# Patient Record
Sex: MALE | Race: WHITE
[De-identification: names, ages, dates, MRNs, and addresses within clinical notes are randomized per-mention and may not be internally consistent; named-entity substitution may affect disease eponyms.]

---

## 2017-09-05 ENCOUNTER — HOSPITAL ENCOUNTER (EMERGENCY)
Dept: HOSPITAL 8 - ED | Age: 62
Discharge: HOME | End: 2017-09-05
Payer: COMMERCIAL

## 2017-09-05 VITALS — WEIGHT: 255.74 LBS | HEIGHT: 67 IN | BODY MASS INDEX: 40.14 KG/M2

## 2017-09-05 VITALS — DIASTOLIC BLOOD PRESSURE: 66 MMHG | SYSTOLIC BLOOD PRESSURE: 101 MMHG

## 2017-09-05 DIAGNOSIS — N20.2: ICD-10-CM

## 2017-09-05 DIAGNOSIS — N13.2: Primary | ICD-10-CM

## 2017-09-05 LAB
AST SERPL-CCNC: 17 U/L (ref 15–37)
BUN SERPL-MCNC: 23 MG/DL (ref 7–18)
HCT VFR BLD CALC: 44.4 % (ref 39.2–51.8)
HGB BLD-MCNC: 14.9 G/DL (ref 13.7–18)
WBC # BLD AUTO: 7.9 X10^3/UL (ref 3.4–10)

## 2017-09-05 PROCEDURE — 99285 EMERGENCY DEPT VISIT HI MDM: CPT

## 2017-09-05 PROCEDURE — 74176 CT ABD & PELVIS W/O CONTRAST: CPT

## 2017-09-05 PROCEDURE — 83690 ASSAY OF LIPASE: CPT

## 2017-09-05 PROCEDURE — 85025 COMPLETE CBC W/AUTO DIFF WBC: CPT

## 2017-09-05 PROCEDURE — 81003 URINALYSIS AUTO W/O SCOPE: CPT

## 2017-09-05 PROCEDURE — 36415 COLL VENOUS BLD VENIPUNCTURE: CPT

## 2017-09-05 PROCEDURE — 80053 COMPREHEN METABOLIC PANEL: CPT

## 2017-09-19 ENCOUNTER — HOSPITAL ENCOUNTER (EMERGENCY)
Dept: HOSPITAL 8 - ED | Age: 62
Discharge: HOME | End: 2017-09-19
Payer: COMMERCIAL

## 2017-09-19 VITALS — SYSTOLIC BLOOD PRESSURE: 120 MMHG | DIASTOLIC BLOOD PRESSURE: 74 MMHG

## 2017-09-19 VITALS — BODY MASS INDEX: 41.99 KG/M2 | HEIGHT: 66 IN | WEIGHT: 261.25 LBS

## 2017-09-19 DIAGNOSIS — N20.0: ICD-10-CM

## 2017-09-19 DIAGNOSIS — Z00.00: Primary | ICD-10-CM

## 2017-09-19 PROCEDURE — 99281 EMR DPT VST MAYX REQ PHY/QHP: CPT

## 2019-03-29 ENCOUNTER — HOSPITAL ENCOUNTER (OUTPATIENT)
Dept: HOSPITAL 8 - WOUND | Age: 64
Discharge: HOME | End: 2019-03-29
Attending: FAMILY MEDICINE
Payer: COMMERCIAL
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
## 2019-03-29 DIAGNOSIS — E66.01: ICD-10-CM

## 2019-03-29 DIAGNOSIS — M45.0: ICD-10-CM
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
## 2019-03-29 DIAGNOSIS — S61.021A: Primary | ICD-10-CM

## 2019-03-29 PROCEDURE — 97598 DBRDMT OPN WND ADDL 20CM/<: CPT

## 2019-03-29 PROCEDURE — 97597 DBRDMT OPN WND 1ST 20 CM/<: CPT

## 2019-03-29 PROCEDURE — 99205 OFFICE O/P NEW HI 60 MIN: CPT

## 2019-03-29 PROCEDURE — 11042 DBRDMT SUBQ TIS 1ST 20SQCM/<: CPT

## 2019-03-29 PROCEDURE — 11104 PUNCH BX SKIN SINGLE LESION: CPT

## 2019-03-29 PROCEDURE — 11045 DBRDMT SUBQ TISS EACH ADDL: CPT

## 2019-04-12 ENCOUNTER — HOSPITAL ENCOUNTER (OUTPATIENT)
Dept: HOSPITAL 8 - WOUND | Age: 64
Discharge: HOME | End: 2019-04-12
Attending: FAMILY MEDICINE
Payer: COMMERCIAL

## 2019-04-12 DIAGNOSIS — M45.0: ICD-10-CM
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
## 2019-04-12 DIAGNOSIS — S61.421D: Primary | ICD-10-CM

## 2019-04-12 DIAGNOSIS — E66.01: ICD-10-CM

## 2019-04-12 PROCEDURE — 99212 OFFICE O/P EST SF 10 MIN: CPT

## 2020-04-09 ENCOUNTER — HOSPITAL ENCOUNTER (OUTPATIENT)
Dept: RADIOLOGY | Facility: MEDICAL CENTER | Age: 65
End: 2020-04-09
Payer: COMMERCIAL

## 2020-04-09 ENCOUNTER — HOSPITAL ENCOUNTER (EMERGENCY)
Dept: HOSPITAL 8 - ED | Age: 65
Discharge: ANOTHER HOSPITAL | End: 2020-04-09
Payer: COMMERCIAL

## 2020-04-09 ENCOUNTER — APPOINTMENT (OUTPATIENT)
Dept: RADIOLOGY | Facility: MEDICAL CENTER | Age: 65
DRG: 038 | End: 2020-04-09
Attending: EMERGENCY MEDICINE
Payer: COMMERCIAL

## 2020-04-09 ENCOUNTER — HOSPITAL ENCOUNTER (INPATIENT)
Facility: MEDICAL CENTER | Age: 65
LOS: 5 days | DRG: 038 | End: 2020-04-14
Attending: EMERGENCY MEDICINE | Admitting: HOSPITALIST
Payer: COMMERCIAL

## 2020-04-09 VITALS — BODY MASS INDEX: 40.03 KG/M2 | WEIGHT: 285.94 LBS | HEIGHT: 71 IN

## 2020-04-09 VITALS — DIASTOLIC BLOOD PRESSURE: 68 MMHG | SYSTOLIC BLOOD PRESSURE: 144 MMHG

## 2020-04-09 DIAGNOSIS — R29.708: ICD-10-CM

## 2020-04-09 DIAGNOSIS — I63.50 CEREBROVASCULAR ACCIDENT (CVA) DUE TO OCCLUSION OF CEREBRAL ARTERY (HCC): ICD-10-CM

## 2020-04-09 DIAGNOSIS — I65.21: ICD-10-CM

## 2020-04-09 DIAGNOSIS — I66.01: ICD-10-CM

## 2020-04-09 DIAGNOSIS — I63.9: Primary | ICD-10-CM

## 2020-04-09 DIAGNOSIS — R51.9 NONINTRACTABLE EPISODIC HEADACHE, UNSPECIFIED HEADACHE TYPE: ICD-10-CM

## 2020-04-09 LAB
APTT PPP: 31.1 SEC (ref 24.7–36)
BASOPHILS # BLD AUTO: 0.02 X10^3/UL (ref 0–0.1)
BASOPHILS NFR BLD AUTO: 0 % (ref 0–1)
EOSINOPHIL # BLD AUTO: 0.14 X10^3/UL (ref 0–0.4)
EOSINOPHIL NFR BLD AUTO: 2 % (ref 1–7)
ERYTHROCYTE [DISTWIDTH] IN BLOOD BY AUTOMATED COUNT: 13.9 % (ref 9.4–14.8)
LYMPHOCYTES # BLD AUTO: 1.24 X10^3/UL (ref 1–3.4)
LYMPHOCYTES NFR BLD AUTO: 19 % (ref 22–44)
MCH RBC QN AUTO: 28.1 PG (ref 27.5–34.5)
MCHC RBC AUTO-ENTMCNC: 32.7 G/DL (ref 33.2–36.2)
MCV RBC AUTO: 86 FL (ref 81–97)
MD: NO
MONOCYTES # BLD AUTO: 0.49 X10^3/UL (ref 0.2–0.8)
MONOCYTES NFR BLD AUTO: 8 % (ref 2–9)
NEUTROPHILS # BLD AUTO: 4.69 X10^3/UL (ref 1.8–6.8)
NEUTROPHILS NFR BLD AUTO: 71 % (ref 42–75)
PLATELET # BLD AUTO: 209 X10^3/UL (ref 130–400)
PMV BLD AUTO: 8.3 FL (ref 7.4–10.4)
RBC # BLD AUTO: 5.26 X10^6/UL (ref 4.38–5.82)

## 2020-04-09 PROCEDURE — 99291 CRITICAL CARE FIRST HOUR: CPT | Performed by: INTERNAL MEDICINE

## 2020-04-09 PROCEDURE — 80047 BASIC METABLC PNL IONIZED CA: CPT

## 2020-04-09 PROCEDURE — G0425 INPT/ED TELECONSULT30: HCPCS | Mod: G0 | Performed by: PSYCHIATRY & NEUROLOGY

## 2020-04-09 PROCEDURE — 99291 CRITICAL CARE FIRST HOUR: CPT

## 2020-04-09 PROCEDURE — 93005 ELECTROCARDIOGRAM TRACING: CPT

## 2020-04-09 PROCEDURE — 70496 CT ANGIOGRAPHY HEAD: CPT

## 2020-04-09 PROCEDURE — 37195 THROMBOLYTIC THERAPY STROKE: CPT

## 2020-04-09 PROCEDURE — 96365 THER/PROPH/DIAG IV INF INIT: CPT

## 2020-04-09 PROCEDURE — 70450 CT HEAD/BRAIN W/O DYE: CPT

## 2020-04-09 PROCEDURE — 70498 CT ANGIOGRAPHY NECK: CPT

## 2020-04-09 PROCEDURE — 700117 HCHG RX CONTRAST REV CODE 255: Performed by: EMERGENCY MEDICINE

## 2020-04-09 PROCEDURE — 36415 COLL VENOUS BLD VENIPUNCTURE: CPT

## 2020-04-09 PROCEDURE — 770022 HCHG ROOM/CARE - ICU (200)

## 2020-04-09 PROCEDURE — 99221 1ST HOSP IP/OBS SF/LOW 40: CPT | Mod: AI | Performed by: HOSPITALIST

## 2020-04-09 PROCEDURE — 83036 HEMOGLOBIN GLYCOSYLATED A1C: CPT

## 2020-04-09 PROCEDURE — 85730 THROMBOPLASTIN TIME PARTIAL: CPT

## 2020-04-09 PROCEDURE — 85025 COMPLETE CBC W/AUTO DIFF WBC: CPT

## 2020-04-09 PROCEDURE — 700111 HCHG RX REV CODE 636 W/ 250 OVERRIDE (IP): Performed by: PSYCHIATRY & NEUROLOGY

## 2020-04-09 RX ORDER — PROCHLORPERAZINE EDISYLATE 5 MG/ML
5-10 INJECTION INTRAMUSCULAR; INTRAVENOUS EVERY 4 HOURS PRN
Status: DISCONTINUED | OUTPATIENT
Start: 2020-04-09 | End: 2020-04-12

## 2020-04-09 RX ORDER — ONDANSETRON 2 MG/ML
4 INJECTION INTRAMUSCULAR; INTRAVENOUS EVERY 4 HOURS PRN
Status: DISCONTINUED | OUTPATIENT
Start: 2020-04-09 | End: 2020-04-12

## 2020-04-09 RX ORDER — PROMETHAZINE HYDROCHLORIDE 25 MG/1
12.5-25 TABLET ORAL EVERY 4 HOURS PRN
Status: DISCONTINUED | OUTPATIENT
Start: 2020-04-09 | End: 2020-04-12

## 2020-04-09 RX ORDER — ASPIRIN 300 MG/1
300 SUPPOSITORY RECTAL DAILY
Status: DISCONTINUED | OUTPATIENT
Start: 2020-04-10 | End: 2020-04-10

## 2020-04-09 RX ORDER — PROMETHAZINE HYDROCHLORIDE 25 MG/1
12.5-25 SUPPOSITORY RECTAL EVERY 4 HOURS PRN
Status: DISCONTINUED | OUTPATIENT
Start: 2020-04-09 | End: 2020-04-12

## 2020-04-09 RX ORDER — ONDANSETRON 4 MG/1
4 TABLET, ORALLY DISINTEGRATING ORAL EVERY 4 HOURS PRN
Status: DISCONTINUED | OUTPATIENT
Start: 2020-04-09 | End: 2020-04-14 | Stop reason: HOSPADM

## 2020-04-09 RX ORDER — LABETALOL HYDROCHLORIDE 5 MG/ML
10-20 INJECTION, SOLUTION INTRAVENOUS EVERY 4 HOURS PRN
Status: DISCONTINUED | OUTPATIENT
Start: 2020-04-09 | End: 2020-04-10

## 2020-04-09 RX ORDER — ASPIRIN 81 MG/1
324 TABLET, CHEWABLE ORAL DAILY
Status: DISCONTINUED | OUTPATIENT
Start: 2020-04-10 | End: 2020-04-10

## 2020-04-09 RX ORDER — POLYETHYLENE GLYCOL 3350 17 G/17G
1 POWDER, FOR SOLUTION ORAL
Status: DISCONTINUED | OUTPATIENT
Start: 2020-04-09 | End: 2020-04-14 | Stop reason: HOSPADM

## 2020-04-09 RX ORDER — AMOXICILLIN 250 MG
2 CAPSULE ORAL 2 TIMES DAILY
Status: DISCONTINUED | OUTPATIENT
Start: 2020-04-09 | End: 2020-04-14 | Stop reason: HOSPADM

## 2020-04-09 RX ORDER — ASPIRIN 325 MG
325 TABLET ORAL DAILY
Status: DISCONTINUED | OUTPATIENT
Start: 2020-04-10 | End: 2020-04-10

## 2020-04-09 RX ORDER — HYDRALAZINE HYDROCHLORIDE 20 MG/ML
10-20 INJECTION INTRAMUSCULAR; INTRAVENOUS EVERY 4 HOURS PRN
Status: DISCONTINUED | OUTPATIENT
Start: 2020-04-09 | End: 2020-04-10

## 2020-04-09 RX ORDER — BISACODYL 10 MG
10 SUPPOSITORY, RECTAL RECTAL
Status: DISCONTINUED | OUTPATIENT
Start: 2020-04-09 | End: 2020-04-14 | Stop reason: HOSPADM

## 2020-04-09 RX ORDER — SECUKINUMAB 150 MG/ML
150 INJECTION SUBCUTANEOUS
COMMUNITY
Start: 2020-03-17

## 2020-04-09 RX ORDER — HYDRALAZINE HYDROCHLORIDE 20 MG/ML
10 INJECTION INTRAMUSCULAR; INTRAVENOUS EVERY 6 HOURS PRN
Status: DISCONTINUED | OUTPATIENT
Start: 2020-04-09 | End: 2020-04-09

## 2020-04-09 RX ORDER — HEPARIN SODIUM 5000 [USP'U]/100ML
INJECTION, SOLUTION INTRAVENOUS CONTINUOUS
Status: DISCONTINUED | OUTPATIENT
Start: 2020-04-09 | End: 2020-04-12

## 2020-04-09 RX ADMIN — HEPARIN SODIUM 1000 UNITS/HR: 5000 INJECTION, SOLUTION INTRAVENOUS at 22:26

## 2020-04-09 RX ADMIN — IOHEXOL 60 ML: 350 INJECTION, SOLUTION INTRAVENOUS at 21:28

## 2020-04-09 ASSESSMENT — LIFESTYLE VARIABLES
EVER FELT BAD OR GUILTY ABOUT YOUR DRINKING: NO
DOES PATIENT WANT TO STOP DRINKING: NO
TOTAL SCORE: 0
EVER HAD A DRINK FIRST THING IN THE MORNING TO STEADY YOUR NERVES TO GET RID OF A HANGOVER: NO
TOTAL SCORE: 0
HAVE YOU EVER FELT YOU SHOULD CUT DOWN ON YOUR DRINKING: NO
CONSUMPTION TOTAL: INCOMPLETE
DO YOU DRINK ALCOHOL: NO
HAVE PEOPLE ANNOYED YOU BY CRITICIZING YOUR DRINKING: NO
TOTAL SCORE: 0

## 2020-04-10 ENCOUNTER — APPOINTMENT (OUTPATIENT)
Dept: RADIOLOGY | Facility: MEDICAL CENTER | Age: 65
DRG: 038 | End: 2020-04-10
Attending: INTERNAL MEDICINE
Payer: COMMERCIAL

## 2020-04-10 ENCOUNTER — APPOINTMENT (OUTPATIENT)
Dept: RADIOLOGY | Facility: MEDICAL CENTER | Age: 65
DRG: 038 | End: 2020-04-10
Attending: HOSPITALIST
Payer: COMMERCIAL

## 2020-04-10 ENCOUNTER — APPOINTMENT (OUTPATIENT)
Dept: CARDIOLOGY | Facility: MEDICAL CENTER | Age: 65
DRG: 038 | End: 2020-04-10
Attending: INTERNAL MEDICINE
Payer: COMMERCIAL

## 2020-04-10 DIAGNOSIS — I63.511 CEREBROVASCULAR ACCIDENT (CVA) DUE TO OCCLUSION OF RIGHT MIDDLE CEREBRAL ARTERY (HCC): ICD-10-CM

## 2020-04-10 PROBLEM — I65.21 OCCLUSION OF RIGHT CAROTID ARTERY: Status: ACTIVE | Noted: 2020-04-10

## 2020-04-10 PROBLEM — E66.01 CLASS 3 SEVERE OBESITY DUE TO EXCESS CALORIES WITH SERIOUS COMORBIDITY IN ADULT (HCC): Status: ACTIVE | Noted: 2020-04-10

## 2020-04-10 PROBLEM — E66.813 CLASS 3 SEVERE OBESITY DUE TO EXCESS CALORIES WITH SERIOUS COMORBIDITY IN ADULT (HCC): Status: ACTIVE | Noted: 2020-04-10

## 2020-04-10 PROBLEM — M45.9 ANKYLOSING SPONDYLITIS (HCC): Status: ACTIVE | Noted: 2020-04-10

## 2020-04-10 LAB
ANION GAP SERPL CALC-SCNC: 10 MMOL/L (ref 7–16)
APTT PPP: 39.8 SEC (ref 24.7–36)
APTT PPP: 51.3 SEC (ref 24.7–36)
APTT PPP: 56.9 SEC (ref 24.7–36)
BASOPHILS # BLD AUTO: 0.5 % (ref 0–1.8)
BASOPHILS # BLD: 0.04 K/UL (ref 0–0.12)
BUN SERPL-MCNC: 14 MG/DL (ref 8–22)
CALCIUM SERPL-MCNC: 9 MG/DL (ref 8.5–10.5)
CHLORIDE SERPL-SCNC: 103 MMOL/L (ref 96–112)
CHOLEST SERPL-MCNC: 123 MG/DL (ref 100–199)
CO2 SERPL-SCNC: 25 MMOL/L (ref 20–33)
CREAT SERPL-MCNC: 0.68 MG/DL (ref 0.5–1.4)
EOSINOPHIL # BLD AUTO: 0 K/UL (ref 0–0.51)
EOSINOPHIL NFR BLD: 0 % (ref 0–6.9)
ERYTHROCYTE [DISTWIDTH] IN BLOOD BY AUTOMATED COUNT: 42 FL (ref 35.9–50)
EST. AVERAGE GLUCOSE BLD GHB EST-MCNC: 117 MG/DL
GLUCOSE SERPL-MCNC: 94 MG/DL (ref 65–99)
HBA1C MFR BLD: 5.7 % (ref 0–5.6)
HCT VFR BLD AUTO: 42.9 % (ref 42–52)
HDLC SERPL-MCNC: 41 MG/DL
HGB BLD-MCNC: 14.1 G/DL (ref 14–18)
IMM GRANULOCYTES # BLD AUTO: 0.03 K/UL (ref 0–0.11)
IMM GRANULOCYTES NFR BLD AUTO: 0.4 % (ref 0–0.9)
LDLC SERPL CALC-MCNC: 71 MG/DL
LV EJECT FRACT  99904: 65
LV EJECT FRACT MOD 2C 99903: 58.7
LV EJECT FRACT MOD 4C 99902: 66.38
LV EJECT FRACT MOD BP 99901: 63.12
LYMPHOCYTES # BLD AUTO: 1.35 K/UL (ref 1–4.8)
LYMPHOCYTES NFR BLD: 18.1 % (ref 22–41)
MCH RBC QN AUTO: 27.9 PG (ref 27–33)
MCHC RBC AUTO-ENTMCNC: 32.9 G/DL (ref 33.7–35.3)
MCV RBC AUTO: 85 FL (ref 81.4–97.8)
MONOCYTES # BLD AUTO: 0.59 K/UL (ref 0–0.85)
MONOCYTES NFR BLD AUTO: 7.9 % (ref 0–13.4)
NEUTROPHILS # BLD AUTO: 5.43 K/UL (ref 1.82–7.42)
NEUTROPHILS NFR BLD: 73.1 % (ref 44–72)
NRBC # BLD AUTO: 0 K/UL
NRBC BLD-RTO: 0 /100 WBC
PLATELET # BLD AUTO: 208 K/UL (ref 164–446)
PMV BLD AUTO: 10.3 FL (ref 9–12.9)
POTASSIUM SERPL-SCNC: 3.7 MMOL/L (ref 3.6–5.5)
RBC # BLD AUTO: 5.05 M/UL (ref 4.7–6.1)
SODIUM SERPL-SCNC: 138 MMOL/L (ref 135–145)
TRIGL SERPL-MCNC: 55 MG/DL (ref 0–149)
WBC # BLD AUTO: 7.4 K/UL (ref 4.8–10.8)

## 2020-04-10 PROCEDURE — A9270 NON-COVERED ITEM OR SERVICE: HCPCS | Performed by: SURGERY

## 2020-04-10 PROCEDURE — 700102 HCHG RX REV CODE 250 W/ 637 OVERRIDE(OP): Performed by: SURGERY

## 2020-04-10 PROCEDURE — 770022 HCHG ROOM/CARE - ICU (200)

## 2020-04-10 PROCEDURE — 85730 THROMBOPLASTIN TIME PARTIAL: CPT | Mod: 91

## 2020-04-10 PROCEDURE — 70551 MRI BRAIN STEM W/O DYE: CPT

## 2020-04-10 PROCEDURE — 92610 EVALUATE SWALLOWING FUNCTION: CPT

## 2020-04-10 PROCEDURE — 93306 TTE W/DOPPLER COMPLETE: CPT | Mod: 26 | Performed by: INTERNAL MEDICINE

## 2020-04-10 PROCEDURE — 85025 COMPLETE CBC W/AUTO DIFF WBC: CPT

## 2020-04-10 PROCEDURE — 93306 TTE W/DOPPLER COMPLETE: CPT

## 2020-04-10 PROCEDURE — 700102 HCHG RX REV CODE 250 W/ 637 OVERRIDE(OP): Performed by: PSYCHIATRY & NEUROLOGY

## 2020-04-10 PROCEDURE — A9270 NON-COVERED ITEM OR SERVICE: HCPCS | Performed by: HOSPITALIST

## 2020-04-10 PROCEDURE — 700105 HCHG RX REV CODE 258: Performed by: INTERNAL MEDICINE

## 2020-04-10 PROCEDURE — 80061 LIPID PANEL: CPT

## 2020-04-10 PROCEDURE — A9270 NON-COVERED ITEM OR SERVICE: HCPCS | Performed by: PSYCHIATRY & NEUROLOGY

## 2020-04-10 PROCEDURE — 700102 HCHG RX REV CODE 250 W/ 637 OVERRIDE(OP): Performed by: HOSPITALIST

## 2020-04-10 PROCEDURE — 80048 BASIC METABOLIC PNL TOTAL CA: CPT

## 2020-04-10 PROCEDURE — 99291 CRITICAL CARE FIRST HOUR: CPT | Performed by: PSYCHIATRY & NEUROLOGY

## 2020-04-10 RX ORDER — OMEPRAZOLE 20 MG/1
20 CAPSULE, DELAYED RELEASE ORAL ONCE
Status: COMPLETED | OUTPATIENT
Start: 2020-04-10 | End: 2020-04-10

## 2020-04-10 RX ORDER — HYDRALAZINE HYDROCHLORIDE 20 MG/ML
10-20 INJECTION INTRAMUSCULAR; INTRAVENOUS EVERY 4 HOURS PRN
Status: DISCONTINUED | OUTPATIENT
Start: 2020-04-10 | End: 2020-04-12

## 2020-04-10 RX ORDER — SODIUM CHLORIDE 9 MG/ML
INJECTION, SOLUTION INTRAVENOUS CONTINUOUS
Status: DISCONTINUED | OUTPATIENT
Start: 2020-04-10 | End: 2020-04-13

## 2020-04-10 RX ORDER — CLOPIDOGREL BISULFATE 75 MG/1
75 TABLET ORAL DAILY
Status: DISCONTINUED | OUTPATIENT
Start: 2020-04-10 | End: 2020-04-14 | Stop reason: HOSPADM

## 2020-04-10 RX ORDER — LABETALOL HYDROCHLORIDE 5 MG/ML
10-20 INJECTION, SOLUTION INTRAVENOUS EVERY 4 HOURS PRN
Status: DISCONTINUED | OUTPATIENT
Start: 2020-04-10 | End: 2020-04-12

## 2020-04-10 RX ORDER — HYDRALAZINE HYDROCHLORIDE 20 MG/ML
20 INJECTION INTRAMUSCULAR; INTRAVENOUS EVERY 4 HOURS PRN
Status: DISCONTINUED | OUTPATIENT
Start: 2020-04-10 | End: 2020-04-10

## 2020-04-10 RX ADMIN — OMEPRAZOLE 20 MG: 20 CAPSULE, DELAYED RELEASE ORAL at 13:09

## 2020-04-10 RX ADMIN — SODIUM CHLORIDE: 9 INJECTION, SOLUTION INTRAVENOUS at 00:30

## 2020-04-10 RX ADMIN — SENNOSIDES AND DOCUSATE SODIUM 2 TABLET: 8.6; 5 TABLET ORAL at 17:48

## 2020-04-10 RX ADMIN — CLOPIDOGREL BISULFATE 75 MG: 75 TABLET ORAL at 22:33

## 2020-04-10 SDOH — HEALTH STABILITY: MENTAL HEALTH: HOW OFTEN DO YOU HAVE A DRINK CONTAINING ALCOHOL?: NEVER

## 2020-04-10 ASSESSMENT — ENCOUNTER SYMPTOMS
FLANK PAIN: 0
MYALGIAS: 0
SENSORY CHANGE: 1
HEARTBURN: 1
MUSCULOSKELETAL NEGATIVE: 1
SPEECH CHANGE: 0
GASTROINTESTINAL NEGATIVE: 1
WEAKNESS: 0
CONSTITUTIONAL NEGATIVE: 1
PSYCHIATRIC NEGATIVE: 1
HALLUCINATIONS: 0
NERVOUS/ANXIOUS: 0
COUGH: 0
RESPIRATORY NEGATIVE: 1
NAUSEA: 0
DEPRESSION: 0
CARDIOVASCULAR NEGATIVE: 1
HEADACHES: 0
EYES NEGATIVE: 1
BLURRED VISION: 0
NECK PAIN: 0
LOSS OF CONSCIOUSNESS: 0
SORE THROAT: 0
FEVER: 0
SPEECH CHANGE: 1
SEIZURES: 0
VOMITING: 0
DIZZINESS: 0
BACK PAIN: 0
CHILLS: 0
ABDOMINAL PAIN: 0
BRUISES/BLEEDS EASILY: 0
WEIGHT LOSS: 0
FOCAL WEAKNESS: 1
SHORTNESS OF BREATH: 0

## 2020-04-10 ASSESSMENT — LIFESTYLE VARIABLES
EVER FELT BAD OR GUILTY ABOUT YOUR DRINKING: NO
HAVE YOU EVER FELT YOU SHOULD CUT DOWN ON YOUR DRINKING: NO
TOTAL SCORE: 0
EVER_SMOKED: NEVER
HAVE PEOPLE ANNOYED YOU BY CRITICIZING YOUR DRINKING: NO
EVER HAD A DRINK FIRST THING IN THE MORNING TO STEADY YOUR NERVES TO GET RID OF A HANGOVER: NO
HOW MANY TIMES IN THE PAST YEAR HAVE YOU HAD 5 OR MORE DRINKS IN A DAY: 0
CONSUMPTION TOTAL: NEGATIVE
TOTAL SCORE: 0
ON A TYPICAL DAY WHEN YOU DRINK ALCOHOL HOW MANY DRINKS DO YOU HAVE: 0
TOTAL SCORE: 0
DOES PATIENT WANT TO STOP DRINKING: NO
AVERAGE NUMBER OF DAYS PER WEEK YOU HAVE A DRINK CONTAINING ALCOHOL: 0
ALCOHOL_USE: NO

## 2020-04-10 ASSESSMENT — COGNITIVE AND FUNCTIONAL STATUS - GENERAL
TOILETING: A LITTLE
MOBILITY SCORE: 18
DAILY ACTIVITIY SCORE: 19
SUGGESTED CMS G CODE MODIFIER MOBILITY: CK
HELP NEEDED FOR BATHING: A LITTLE
TURNING FROM BACK TO SIDE WHILE IN FLAT BAD: A LITTLE
MOVING FROM LYING ON BACK TO SITTING ON SIDE OF FLAT BED: A LITTLE
SUGGESTED CMS G CODE MODIFIER DAILY ACTIVITY: CK
DRESSING REGULAR UPPER BODY CLOTHING: A LITTLE
WALKING IN HOSPITAL ROOM: A LITTLE
PERSONAL GROOMING: A LITTLE
DRESSING REGULAR LOWER BODY CLOTHING: A LITTLE
CLIMB 3 TO 5 STEPS WITH RAILING: A LITTLE
STANDING UP FROM CHAIR USING ARMS: A LITTLE
MOVING TO AND FROM BED TO CHAIR: A LITTLE

## 2020-04-10 ASSESSMENT — PATIENT HEALTH QUESTIONNAIRE - PHQ9
7. TROUBLE CONCENTRATING ON THINGS, SUCH AS READING THE NEWSPAPER OR WATCHING TELEVISION: NOT AT ALL
4. FEELING TIRED OR HAVING LITTLE ENERGY: NOT AT ALL
3. TROUBLE FALLING OR STAYING ASLEEP OR SLEEPING TOO MUCH: NOT AT ALL
1. LITTLE INTEREST OR PLEASURE IN DOING THINGS: NOT AT ALL
8. MOVING OR SPEAKING SO SLOWLY THAT OTHER PEOPLE COULD HAVE NOTICED. OR THE OPPOSITE, BEING SO FIGETY OR RESTLESS THAT YOU HAVE BEEN MOVING AROUND A LOT MORE THAN USUAL: NOT AT ALL
2. FEELING DOWN, DEPRESSED, IRRITABLE, OR HOPELESS: SEVERAL DAYS
6. FEELING BAD ABOUT YOURSELF - OR THAT YOU ARE A FAILURE OR HAVE LET YOURSELF OR YOUR FAMILY DOWN: NOT AL ALL
SUM OF ALL RESPONSES TO PHQ9 QUESTIONS 1 AND 2: 1
SUM OF ALL RESPONSES TO PHQ QUESTIONS 1-9: 1
9. THOUGHTS THAT YOU WOULD BE BETTER OFF DEAD, OR OF HURTING YOURSELF: NOT AT ALL
5. POOR APPETITE OR OVEREATING: NOT AT ALL

## 2020-04-10 ASSESSMENT — COPD QUESTIONNAIRES
COPD SCREENING SCORE: 2
IN THE PAST 12 MONTHS DO YOU DO LESS THAN YOU USED TO BECAUSE OF YOUR BREATHING PROBLEMS: DISAGREE/UNSURE
DO YOU EVER COUGH UP ANY MUCUS OR PHLEGM?: NO/ONLY WITH OCCASIONAL COLDS OR INFECTIONS
HAVE YOU SMOKED AT LEAST 100 CIGARETTES IN YOUR ENTIRE LIFE: NO/DON'T KNOW
DURING THE PAST 4 WEEKS HOW MUCH DID YOU FEEL SHORT OF BREATH: NONE/LITTLE OF THE TIME

## 2020-04-10 NOTE — NUR
TASK RN: LATE ENTRY 4/9/2020 Carina ACEVES FROM LAB CALLED AND REPORTED THAT [facility] 
[facility] LAB VIAL FOR COAGULATION (PT,INR,APTT) UNABLE TO BE COMPLETED DUE TO 
INSUFFICENT QUANTITY AND WOULD NEED TO BE RE-DRAWN. DISCUSSED WITH DR. SCHMIDT, PRIMARY RN IVAN, ALL AWARE, PER DR. SCHMIDT "NO RE-DRAWN 
NEEDED, PT BEING TRANSFERRED TO [facility] AND TPA ALREADY INFUSING"

## 2020-04-10 NOTE — H&P
Hospital Medicine History & Physical Note    Date of Service  4/9/2020    Primary Care Physician  No primary care provider on file.    Consultants  Neurology  Interventional Radiology  Vascular surgery      Code Status  Full code    Chief Complaint  Left facial droop    History of Presenting Illness  64 y.o. male who presented 4/9/2020 with left sided weakness and facial droop. He presented to [facility] and was found to have a right carotid clot and M3 clot. They infused alteplase and transferred him to [facility] for IR consult, neurology and vascular consult. He started to improve en route.    I discussed the presenting symptoms, physical examination, lab and radiological study results with the emergency department physician.      Review of Systems  Review of Systems   Constitutional: Negative.  Negative for chills, fever, malaise/fatigue and weight loss.   HENT: Negative.    Respiratory: Negative.  Negative for cough and shortness of breath.    Cardiovascular: Negative.  Negative for chest pain and leg swelling.   Gastrointestinal: Negative.  Negative for abdominal pain, nausea and vomiting.   Genitourinary: Negative.  Negative for dysuria and flank pain.   Musculoskeletal: Negative.  Negative for back pain and myalgias.   Neurological: Positive for speech change and focal weakness. Negative for dizziness, loss of consciousness and weakness.   Endo/Heme/Allergies: Negative.  Does not bruise/bleed easily.   Psychiatric/Behavioral: Negative.  Negative for depression. The patient is not nervous/anxious.    All other systems reviewed and are negative.      Past Medical History   has no past medical history on file.    Surgical History   has no past surgical history on file.     Family History  family history is not on file.     Social History       Allergies  No Known Allergies    Medications  Prior to Admission Medications   Prescriptions Last Dose Informant Patient Reported? Taking?   COSENTYX SENSOREADY  MG/ML  Solution Auto-injector UNK Patient's Home Pharmacy Yes No   Sig: Inject 150 mg as instructed every 28 days.   Diclofenac Sodium 1 % Gel UNK Patient's Home Pharmacy Yes No   Sig: Apply 2-4 g to affected area(s) 4 times a day as needed for Moderate Pain.      Facility-Administered Medications: None       Physical Exam  Temp:  [36.8 °C (98.2 °F)] 36.8 °C (98.2 °F)  Pulse:  [87-95] 95  Resp:  [16-18] 18  BP: (131-143)/(72-81) 131/81  SpO2:  [95 %-97 %] 95 %    Physical Exam  Vitals signs and nursing note reviewed.   Constitutional:       General: He is not in acute distress.     Appearance: He is well-developed. He is not diaphoretic.   HENT:      Right Ear: External ear normal.      Left Ear: External ear normal.      Nose: Nose normal.      Mouth/Throat:      Pharynx: No oropharyngeal exudate.   Eyes:      General: No scleral icterus.        Right eye: No discharge.         Left eye: No discharge.      Conjunctiva/sclera: Conjunctivae normal.   Neck:      Vascular: No JVD.      Trachea: No tracheal deviation.   Cardiovascular:      Rate and Rhythm: Normal rate and regular rhythm.      Heart sounds: Normal heart sounds.   Pulmonary:      Effort: Pulmonary effort is normal. No respiratory distress.      Breath sounds: Normal breath sounds. No stridor. No wheezing or rales.   Chest:      Chest wall: No tenderness.   Abdominal:      General: Bowel sounds are normal. There is no distension.      Palpations: Abdomen is soft.      Tenderness: There is no abdominal tenderness.   Musculoskeletal:         General: No tenderness.   Skin:     General: Skin is warm and dry.      Coloration: Skin is not pale.   Neurological:      Mental Status: He is alert.      Cranial Nerves: No cranial nerve deficit.      Motor: No abnormal muscle tone.   Psychiatric:         Mood and Affect: Mood normal.         Behavior: Behavior normal.         Thought Content: Thought content normal.         Judgment: Judgment normal.         Laboratory:           No results for input(s): ALTSGPT, ASTSGOT, ALKPHOSPHAT, TBILIRUBIN, DBILIRUBIN, GAMMAGT, AMYLASE, LIPASE, ALB, PREALBUMIN, GLUCOSE in the last 72 hours.      No results for input(s): NTPROBNP in the last 72 hours.      No results for input(s): TROPONINT in the last 72 hours.    Urinalysis:    No results found     Imaging:  CT-CTA HEAD WITH & W/O-POST PROCESS   Final Result      1.  RIGHT M2 occlusion   2.  No intracranial hemorrhage.      Discussed with Dr. Sullivan at 2148      CT-CTA NECK WITH & W/O-POST PROCESSING   Final Result      1.  Mixed density, irregular plaque in the proximal RIGHT internal carotid artery causing less than 50% stenosis   2.  Findings suggestive of ankylosing spondylitis      Discussed with Dr. Sullivan at 2148      CT-HEAD W/O    (Results Pending)   EC-ECHOCARDIOGRAM COMPLETE W/O CONT    (Results Pending)   MR-BRAIN-W/O    (Results Pending)         Assessment/Plan:  I anticipate this patient will require at least two midnights for appropriate medical management, necessitating inpatient admission.    * Cerebrovascular accident (CVA) due to occlusion of right middle cerebral artery (HCC)- (present on admission)  Assessment & Plan  S/p alteplase  Admit to ICU, intensivist will take over care  Dr. Burrell will consult for neurology  IR has evaluated, no clot retrieval indicated with clinical improvement on alteplase  Vascular surgery consult for ulcerated plaque in carotid artery  Post-alteplase orders set completed      VTE prophylaxis: scd

## 2020-04-10 NOTE — ED PROVIDER NOTES
ED Provider Note    Scribed for Dr. Buster Virk M.D. by Danitza Rodarte. 4/9/2020  8:08 PM    Primary care provider: None Noted  Means of arrival: EMS  History obtained from: Patient  History limited by: None    CHIEF COMPLAINT  Chief Complaint   Patient presents with   • Possible Stroke     Transfer from [facility], TPA infusion completed at time of arrival, transfer for Interventional Radiology       HPI  Jose Simpson is a 64 y.o. male who presents to the Emergency Department as a transfer from [place] for complaints of sudden left sided weakness and facial droop onset 6 PM. Clot in right carotid was found at [place] and patient was given TPA infusion treatment. He was sent to this facility for interventional radiology. Currently, patient states that symptoms have somewhat improved. He states that onset was sudden and involved symptoms of difficulty speaking, left sided facial droop, and left sided weakness. He describes not being able to move left arm. He is now able to speak and move extremities. Negative for fever, cough, or shortness of breath.      REVIEW OF SYSTEMS  Pertinent positives include left sided facial droop, left sided weakness, and difficulty speaking. Pertinent negatives include no fever, cough, or shortness of breath. As above, all other systems reviewed and are negative.   See HPI for further details.     PAST MEDICAL HISTORY   Clot in right carotid.     SURGICAL HISTORY  patient denies any surgical history    SOCIAL HISTORY  Social History     Tobacco Use   • Smoking status: Not on file   Substance Use Topics   • Alcohol use: Not on file   • Drug use: Not on file      Social History     Substance and Sexual Activity   Drug Use Not on file       FAMILY HISTORY  No family history on file.    CURRENT MEDICATIONS  Home Medications     Reviewed by Calderon Cuellar R.N. (Registered Nurse) on 04/09/20 at 2012  Med List Status: Partial   Medication Last Dose Status        Patient  "Bobby Taking any Medications                       ALLERGIES  No Known Allergies    PHYSICAL EXAM  VITAL SIGNS: /72   Pulse 87   Temp 36.8 °C (98.2 °F) (Temporal)   Resp 16   Ht 1.702 m (5' 7\")   Wt 123 kg (271 lb 2.7 oz)   SpO2 97%   BMI 42.47 kg/m²     Constitutional: Well developed, Well nourished, No acute distress, Non-toxic appearance.   HENT: Normocephalic, Atraumatic, Bilateral external ears normal, Oropharynx moist, No oral exudates.   Eyes: PERRLA, EOMI, Conjunctiva normal, No discharge.   Neck: No tenderness, Supple, No stridor.   Lymphatic: No lymphadenopathy noted.   Cardiovascular: Normal heart rate, Normal rhythm.   Thorax & Lungs: Clear to auscultation bilaterally, No respiratory distress, No wheezing, No crackles.   Abdomen: Soft, No tenderness, No masses, No pulsatile masses.   Skin: Warm, Dry, No erythema, No rash.   Extremities:, No edema No cyanosis.   Musculoskeletal: No tenderness to palpation or major deformities noted.  Intact distal pulses  Neurologic: Awake, alert. Moves all extremities spontaneously. Minimal left sided droop seems now resolved.   Psychiatric: Affect normal, Judgment normal, Mood normal.     LABS     All labs reviewed by me.    RADIOLOGY  CT-CTA HEAD WITH & W/O-POST PROCESS    (Results Pending)   CT-CTA NECK WITH & W/O-POST PROCESSING    (Results Pending)     The radiologist's interpretation of all radiological studies have been reviewed by me.    COURSE & MEDICAL DECISION MAKING  Pertinent Labs & Imaging studies reviewed. (See chart for details)    8:08 PM - Patient seen and examined at bedside. Patient presents as a transfer from [place] after being diagnosed with right carotid clot. TPA infusion was given to him at [place]. He was sent her for interventional radiology. Symptoms have drastically improved since onset.  He does not seem to have any deficits at this point.  This case was discussed with the interventional radiologist who felt that " intervention was not needed at this time with complete resolution of symptoms    8:27 PM - Paged Neurology    8:30 PM - I discussed the patient's case and the above findings with Dr. Burrell (Neurology) recommends patient be admitted for further evaluation.  Recommended repeat CTA of the head and neck which is been ordered  8:33 PM - Paged Hospitalist.     8:50 PM - I discussed the patient's case and the above findings with Dr. Palma (Hospitalist) who agrees to further evaluate the patient in the hospital.      Decision Making:  Patient presenting with symptoms of stroke which now seem completely resolved.  After administration of IV TPA.  Will be admitted for further stroke evaluation.  Case is been discussed with the patient plan at this point.  Patient was admitted to the ICU  DISPOSITION:  Patient will be hospitalized by Dr. Palma in guarded condition.     FINAL IMPRESSION  CVA     Danitza POST (Scribe), am scribing for, and in the presence of, Buster Virk M.D..    Electronically signed by: Danitza Rodarte (Scribe), 4/9/2020    Buster POST M.D. personally performed the services described in this documentation, as scribed by Danitza Rodarte in my presence, and it is both accurate and complete. C    The note accurately reflects work and decisions made by me.  Buster Virk M.D.  4/9/2020  9:18 PM

## 2020-04-10 NOTE — DISCHARGE PLANNING
Care Transition Team Assessment        In the case of an emergency, pt's legal NOK is: Danielle Simpson--wife--[phone]     RNCM contacted pt via phone and obtained the following information used in this assessment. Pt verified accuracy of facesheet. Pt's lives with his wife in a 2 story home.Pt uses [facility] pharmacy. Prior to current hospitalization pt was completely independent in ADLS/IADLS. Pt drives to get to necessary MD appointments. However states concerns if he will still be allowed to drive post-hospitalization. Pt denies financial concerns. Pt has a strong support system. Pt denies any hx of substance use and denies hx of any mental health.    Upon discharge patient states that his wife will provide discharge transportation to home.      Information Source  Orientation : Oriented x 4  Information Given By: Patient  Informant's Name: Jose  Who is responsible for making decisions for patient? : Patient    Readmission Evaluation  Is this a readmission?: No    Elopement Risk  Legal Hold: No  Ambulatory or Self Mobile in Wheelchair: No-Not an Elopement Risk  Elopement Risk: Not at Risk for Elopement    Interdisciplinary Discharge Planning  Does Admitting Nurse Feel This Could be a Complex Discharge?: No  Primary Care Physician: Tara Garza [facility]  Lives with - Patient's Self Care Capacity: Spouse  Patient or legal guardian wants to designate a caregiver (see row info): No  Support Systems: Family Member(s)  Housing / Facility: 2 Story House  Do You Take your Prescribed Medications Regularly: Yes  Able to Return to Previous ADL's: Yes  Mobility Issues: No  Prior Services: None  Patient Expects to be Discharged to:: Home  Assistance Needed: Unknown at this Time  Durable Medical Equipment: (Cane)    Discharge Preparedness  What is your plan after discharge?: Home with help  What are your discharge supports?: Spouse  Prior Functional Level: Ambulatory, Independent with Activities of Daily  Living  Difficulity with ADLs: None  Difficulity with IADLs: None    Functional Assesment  Prior Functional Level: Ambulatory, Independent with Activities of Daily Living    Finances  Financial Barriers to Discharge: No  Prescription Coverage: Yes    Vision / Hearing Impairment  Vision Impairment : No  Hearing Impairment : No         Advance Directive  Advance Directive?: None    Domestic Abuse  Have you ever been the victim of abuse or violence?: No  Physical Abuse or Sexual Abuse: No  Verbal Abuse or Emotional Abuse: No  Possible Abuse Reported to:: Not Applicable    Psychological Assessment  History of Substance Abuse: None  History of Psychiatric Problems: No  Non-compliant with Treatment: No  Newly Diagnosed Illness: Yes    Discharge Risks or Barriers  Discharge risks or barriers?: No    Anticipated Discharge Information  Anticipated discharge disposition: Home, [facility]  Discharge Address: [address] [place]  Discharge Contact Phone Number: [phone]

## 2020-04-10 NOTE — CONSULTS
Critical Care Consultation    Date of consult: 4/9/2020    Referring Physician  Buster Virk M.D.    Reason for Consultation  CVA s/p TPA    History of Presenting Illness  64 y.o. male who presented 4/9/2020 with diet-controlled borderline diabetes, ankylosing spondylitis, and obesity who was in his usual state of health this evening when approximately 1900 while making his dinner he developed sudden onset of left-sided weakness, facial droop and speech difficulties.  His wife called 911 and he was brought into the emergency department at [facility] where he underwent imaging.  He was given TPA and was found to have a right ICA and right M2 occlusion for which she was transferred to [facility] for neuro intervention.  While being transferred to [facility] his symptoms began to resolve and upon arrival to the ED here the decision was made not to intervene on the clot given improvement in symptoms and risk status post TPA.  Vascular surgery was consulted and he was started on a heparin drip and being admitted to the intensive care unit in critical condition.  I was consulted for his critical care management.  Patient denies any recent trauma, chiropractic manipulation, prior stroke, recent illness.  He is left hand dominant.    Code Status  Full Code    Review of Systems  Review of Systems   Constitutional: Negative for chills and fever.   HENT: Negative for sore throat.    Eyes: Negative for blurred vision.   Respiratory: Negative for cough and shortness of breath.    Cardiovascular: Negative for chest pain and leg swelling.   Gastrointestinal: Negative for nausea and vomiting.   Genitourinary: Negative for dysuria.   Musculoskeletal: Negative for back pain and neck pain.   Skin: Negative for rash.   Neurological: Positive for sensory change, speech change and focal weakness. Negative for dizziness, seizures, loss of consciousness and headaches.   Endo/Heme/Allergies: Does not bruise/bleed easily.    Psychiatric/Behavioral: Negative for depression and hallucinations. The patient is not nervous/anxious.    All other systems reviewed and are negative.      Past Medical History   has no past medical history on file. -Diet-controlled borderline diabetes, obesity, ankylosing spondylitis    Surgical History   has no past surgical history on file. -Colon resection for diverticulitis, incisional hernia status post repair with mesh    Family History  family history is not on file. -No family history of stroke, diabetes, hypertension    Social History   Patient is .  He denies alcohol, tobacco, illicit drugs.  He is retired.    Medications  Home Medications     Reviewed by Farzaneh Tanner (Pharmacy Tech) on 04/09/20 at 2118  Med List Status: Complete   Medication Last Dose Status   COSENTYX SENSOREADY  MG/ML Solution Auto-injector UNK Active   Diclofenac Sodium 1 % Gel UNK Active              Current Facility-Administered Medications   Medication Dose Route Frequency Provider Last Rate Last Dose   • senna-docusate (PERICOLACE or SENOKOT S) 8.6-50 MG per tablet 2 Tab  2 Tab Oral BID Yamel Palma M.D.        And   • polyethylene glycol/lytes (MIRALAX) PACKET 1 Packet  1 Packet Oral QDAY PRSHARON Palma M.D.        And   • magnesium hydroxide (MILK OF MAGNESIA) suspension 30 mL  30 mL Oral QDAY PRSHARON Palma M.D.        And   • bisacodyl (DULCOLAX) suppository 10 mg  10 mg Rectal QDAY PRSHARON Palma M.D.       • ondansetron (ZOFRAN) syringe/vial injection 4 mg  4 mg Intravenous Q4HRS DELVIN Palma M.D.       • ondansetron (ZOFRAN ODT) dispertab 4 mg  4 mg Oral Q4HRS PRSHARON Palma M.D.       • promethazine (PHENERGAN) tablet 12.5-25 mg  12.5-25 mg Oral Q4HRS PRSHARON Palma M.D.       • promethazine (PHENERGAN) suppository 12.5-25 mg  12.5-25 mg Rectal Q4HRS PRSHARON Palma M.D.       • prochlorperazine (COMPAZINE) injection 5-10 mg  5-10 mg Intravenous Q4HRS PRSHARON Watsonn  NISHA Palma       • [START ON 4/10/2020] aspirin (ASA) tablet 325 mg  325 mg Oral DAILY Yamel Palma M.D.        Or   • [START ON 4/10/2020] aspirin (ASA) chewable tab 324 mg  324 mg Oral DAILY Yamel Palma M.D.        Or   • [START ON 4/10/2020] aspirin (ASA) suppository 300 mg  300 mg Rectal DAILY Yamel Palma M.D.         Current Outpatient Medications   Medication Sig Dispense Refill   • Diclofenac Sodium 1 % Gel Apply 2-4 g to affected area(s) 4 times a day as needed for Moderate Pain.     • COSENTYX SENSOREADY  MG/ML Solution Auto-injector Inject 150 mg as instructed every 28 days.         Allergies  No Known Allergies    Vital Signs last 24 hours  Temp:  [36.8 °C (98.2 °F)] 36.8 °C (98.2 °F)  Pulse:  [87-95] 95  Resp:  [16-18] 18  BP: (131-143)/(72-81) 131/81  SpO2:  [95 %-97 %] 95 %    Physical Exam  Physical Exam  Vitals signs and nursing note reviewed.   Constitutional:       General: He is not in acute distress.     Appearance: He is obese. He is ill-appearing.   HENT:      Head: Normocephalic and atraumatic.      Right Ear: External ear normal.      Left Ear: External ear normal.      Nose: Nose normal. No congestion.      Mouth/Throat:      Mouth: Mucous membranes are moist.      Pharynx: Oropharynx is clear.   Eyes:      Extraocular Movements: Extraocular movements intact.      Conjunctiva/sclera: Conjunctivae normal.      Pupils: Pupils are equal, round, and reactive to light.   Neck:      Musculoskeletal: Neck supple. No muscular tenderness.   Cardiovascular:      Rate and Rhythm: Normal rate and regular rhythm.      Pulses: Normal pulses.      Heart sounds: Normal heart sounds. No murmur.   Pulmonary:      Effort: Pulmonary effort is normal. No respiratory distress.      Breath sounds: Normal breath sounds. No wheezing or rhonchi.   Abdominal:      General: Bowel sounds are normal. There is no distension.      Palpations: Abdomen is soft.      Tenderness: There is no abdominal  tenderness.      Comments: Scar from prior surgery noted   Musculoskeletal:         General: No tenderness.      Right lower leg: No edema.      Left lower leg: No edema.   Skin:     General: Skin is warm and dry.      Capillary Refill: Capillary refill takes less than 2 seconds.      Findings: No rash.   Neurological:      Mental Status: He is alert and oriented to person, place, and time.      Comments: Residual left facial droop, speech intact, trace left-sided weakness with fine motor deficits, sensation intact   Psychiatric:         Mood and Affect: Mood normal.         Behavior: Behavior normal.         Fluids  No intake or output data in the 24 hours ending 04/10/20 0030    Laboratory  Recent Results (from the past 48 hour(s))   APTT    Collection Time: 04/09/20 10:20 PM   Result Value Ref Range    APTT 31.1 24.7 - 36.0 sec       Imaging  CT-CTA HEAD WITH & W/O-POST PROCESS   Final Result      1.  RIGHT M2 occlusion   2.  No intracranial hemorrhage.      Discussed with Dr. Sullivan at 2148      CT-CTA NECK WITH & W/O-POST PROCESSING   Final Result      1.  Mixed density, irregular plaque in the proximal RIGHT internal carotid artery causing less than 50% stenosis   2.  Findings suggestive of ankylosing spondylitis      Discussed with Dr. Sullivan at 2148      CT-HEAD W/O    (Results Pending)       Assessment/Plan  * Cerebrovascular accident (CVA) due to occlusion of right middle cerebral artery (HCC)  Assessment & Plan  With a right MCA syndrome status post TPA 4/9 at 1900  Neurology consultation  Strict blood pressure control with goal SBP less than 180, DBP less than 105  Antiplatelet  Echocardiography, lipid panel, hemoglobin A1c  MRI brain  Repeat CT head without contrast 24 hours post TPA  PT/OT/SLP  Close neurologic monitoring    Occlusion of right carotid artery  Assessment & Plan  With ulcerating plaque as likely cause of CVA  Vascular surgery consultation  Heparin drip  Dual antiplatelet  therapy  Possible right CEA in the near future    Class 3 severe obesity due to excess calories with serious comorbidity in adult ([facility])  Assessment & Plan  Dietary and behavioral modifications to encourage weight loss  RD consultation    Ankylosing spondylitis ([facility])  Assessment & Plan  Aspirin for now      Discussed patient condition and risk of morbidity and/or mortality with RN, Charge nurse / hot rounds, Patient, neurology and Emergency physician.    The patient remains critically ill.  Critical care time = 32 minutes in directly providing and coordinating critical care and extensive data review.  No time overlap and excludes procedures.

## 2020-04-10 NOTE — CONSULTS
Vascular Surgery Consult Note  -------------------------------------------------------------------------------------------------  Date: 4/10/2020    Consulting Physician: Brady Yeboah M.D. [facility]  -------------------------------------------------------------------------------------------------    Reason for consultation:  Ulcerated right carotid plaque    HPI:  This is a 64 y.o. male who is presenting with RMCA stroke which manifested as left arm weakness and aphasia. Patient is L-hand dominant.  He received TPA and has returned to his clinical baseline.  Normal conversation, normal use of left arm.  CTA shows ulcerated right ICA plaque which is causing less than 50% stenosis, and also occlusion of the right M2 segment which is presumed acute and patient has been started on heparin infusion.    IR evaluated and said since the patient has returned to neuro baseline that intracranial intervention is not recommended.      No past medical history on file.    Past Surgical History:   Procedure Laterality Date   • OTHER ABDOMINAL SURGERY      Diverticulitus 1992/ Hernia Repair 2006       Current Facility-Administered Medications   Medication Dose Route Frequency Provider Last Rate Last Dose   • NS infusion   Intravenous Continuous Jeremy M Gonda, M.D. 83 mL/hr at 04/10/20 0030     • labetalol (NORMODYNE/TRANDATE) injection 10-20 mg  10-20 mg Intravenous Q4HRS PRN Librado Villar M.D.       • hydrALAZINE (APRESOLINE) injection 10-20 mg  10-20 mg Intravenous Q4HRS PRN Librado Villar M.D.       • senna-docusate (PERICOLACE or SENOKOT S) 8.6-50 MG per tablet 2 Tab  2 Tab Oral BID Yamel Palma M.D.        And   • polyethylene glycol/lytes (MIRALAX) PACKET 1 Packet  1 Packet Oral QDAY PRN Yamel Palma M.D.        And   • magnesium hydroxide (MILK OF MAGNESIA) suspension 30 mL  30 mL Oral QDAY PRN Yamel Palma M.D.        And   • bisacodyl (DULCOLAX) suppository 10 mg  10 mg Rectal QDAY PRN [place]  NISHA Palma       • ondansetron (ZOFRAN) syringe/vial injection 4 mg  4 mg Intravenous Q4HRS PRSHARON Palma M.D.       • ondansetron (ZOFRAN ODT) dispertab 4 mg  4 mg Oral Q4HRS PRSHARON Palma M.D.       • promethazine (PHENERGAN) tablet 12.5-25 mg  12.5-25 mg Oral Q4HRS PRSHARON Palma M.D.       • promethazine (PHENERGAN) suppository 12.5-25 mg  12.5-25 mg Rectal Q4HRS PRSHARON Palma M.D.       • prochlorperazine (COMPAZINE) injection 5-10 mg  5-10 mg Intravenous Q4HRS DELVIN Palma M.D.       • heparin infusion 25,000 units in 500 mL 0.45% NACL   Intravenous Continuous Ze Burrell M.D. 22 mL/hr at 04/10/20 1242 1,100 Units/hr at 04/10/20 1242       Social History     Socioeconomic History   • Marital status:      Spouse name: Not on file   • Number of children: Not on file   • Years of education: Not on file   • Highest education level: Not on file   Occupational History   • Not on file   Social Needs   • Financial resource strain: Not on file   • Food insecurity     Worry: Not on file     Inability: Not on file   • Transportation needs     Medical: Not on file     Non-medical: Not on file   Tobacco Use   • Smoking status: Never Smoker   • Smokeless tobacco: Never Used   Substance and Sexual Activity   • Alcohol use: Never     Frequency: Never   • Drug use: Never   • Sexual activity: Not on file   Lifestyle   • Physical activity     Days per week: Not on file     Minutes per session: Not on file   • Stress: Not on file   Relationships   • Social connections     Talks on phone: Not on file     Gets together: Not on file     Attends Faith service: Not on file     Active member of club or organization: Not on file     Attends meetings of clubs or organizations: Not on file     Relationship status: Not on file   • Intimate partner violence     Fear of current or ex partner: Not on file     Emotionally abused: Not on file     Physically abused: Not on file     Forced sexual  "activity: Not on file   Other Topics Concern   • Not on file   Social History Narrative   • Not on file       No family history on file.    Allergies:  Patient has no known allergies.    Review of Systems:  Noncontributory except as per HPI    Physical Exam:  /59   Pulse 68   Temp 36.3 °C (97.3 °F) (Temporal)   Resp 16   Ht 1.702 m (5' 7\")   Wt 121.3 kg (267 lb 6.7 oz)   SpO2 94%     Constitutional: Alert, oriented, no acute distress  HEENT:  Normocephalic and atraumatic, EOMI  Neck:   Supple, no JVD,   Cardiovascular: Regular rate and rhythm,   Pulmonary:  Good air entry bilaterally,    Abdominal:  Soft, non-tender, non-distended     Aortic impulse not widened  Musculoskeletal: No edema, no tenderness  Neurological:  CN II-XII grossly intact, no focal deficits  Skin:   Skin is warm and dry. No rash noted.  Psychiatric:  Normal mood and affect.    Labs:  Recent Labs     04/10/20  0430   WBC 7.4   RBC 5.05   HEMOGLOBIN 14.1   HEMATOCRIT 42.9   MCV 85.0   MCH 27.9   MCHC 32.9*   RDW 42.0   PLATELETCT 208   MPV 10.3     Recent Labs     04/10/20  0530   SODIUM 138   POTASSIUM 3.7   CHLORIDE 103   CO2 25   GLUCOSE 94   BUN 14   CREATININE 0.68   CALCIUM 9.0     Recent Labs     04/09/20  2220 04/10/20  0430 04/10/20  1155   APTT 31.1 39.8* 56.9*           Radiology:  CTA shows Right M2 occlusion and ulcerated YEN plaque    MRI pending    Assessment/Plan:  This is a 64 y.o. male with ulcerated right ICA plaque with associated RMCA embolic stroke that has resolved with TPA.    Patient would benefit from right carotid endarterectomy however I would like to obtain an MRI first to assess degree of parenchymal damage after the stroke.  Clinically I would expect the injury to be minimal however if the MRI does show significant involvement we may need to manage him with DAPT and defer carotid endarterectomy for 2-4 weeks.      Will add Plavix 75mg daily today.    Further recs pending MRI    Brady Yeboah " MD  [facility] (General and Vascular Surgery)  Cell: [phone] (text or call is fine, if you don't reach me please try my office)  Office: [phone]    4/10/2020    1:36 PM  ___________________________________________________________________  Patient:Jose Simpson   MRN:[id number]   CSN:[id number]

## 2020-04-10 NOTE — THERAPY
PT eval order received. Pt is on bedrest untiil 2112pm today due to alteplase administration. Will eval once bedrest is discharged and pt is appropriate for out of bed tasks.

## 2020-04-10 NOTE — CARE PLAN
Problem: Communication  Goal: The ability to communicate needs accurately and effectively will improve  Outcome: PROGRESSING AS EXPECTED  Note: Pt alert and oriented x4, pt asking appropriate questions, able to communicate needs effectively. Neuro assessments completed per protocol. Pt educated on plan of care for tonight's shift, treatments, and medications. All questions answered and needs met at this time.      Problem: Skin Integrity  Goal: Risk for impaired skin integrity will decrease  Outcome: PROGRESSING AS EXPECTED  Note: 2 RN skin check, jake skin scale assessed, all appropriate skin interventions in place.

## 2020-04-10 NOTE — ED TRIAGE NOTES
Chief Complaint   Patient presents with   • Possible Stroke     Transfer from [facility], TPA infusion completed at time of arrival, transfer for Interventional Radiology

## 2020-04-10 NOTE — DIETARY
NUTRITION SERVICES: BMI - Pt with BMI >40 (=Body mass index is 41.88 kg/m².), morbid obesity. Weight loss counseling not appropriate in acute care setting.   RECOMMEND - Referral to outpatient nutrition services for weight management after D/C.

## 2020-04-10 NOTE — PROGRESS NOTES
Neurology Progress Note  Neurohospitalist Service, [facility]    Referring Physician: Librado Villar M.D.    Chief Complaint   Patient presents with   • Possible Stroke     Transfer from [facility], TPA infusion completed at time of arrival, transfer for Interventional Radiology       HPI: Refer to initial documented Neurology H&P, as detailed in the patient's chart.    Interval History 4/10/20: No acute events overnight.  Remains in ICU level of care.     Review of systems: In addition to what is detailed in the HPI and/or updated in the interval history, all other systems reviewed and are negative.    Past Medical History:    has no past medical history on file.    FHx:  family history is not on file.    SHx:   reports that he has never smoked. He has never used smokeless tobacco. He reports that he does not drink alcohol or use drugs.    Medications:    Current Facility-Administered Medications:   •  NS infusion, , Intravenous, Continuous, Jeremy M Gonda, M.D., Last Rate: 83 mL/hr at 04/10/20 0030  •  senna-docusate (PERICOLACE or SENOKOT S) 8.6-50 MG per tablet 2 Tab, 2 Tab, Oral, BID **AND** polyethylene glycol/lytes (MIRALAX) PACKET 1 Packet, 1 Packet, Oral, QDAY PRN **AND** magnesium hydroxide (MILK OF MAGNESIA) suspension 30 mL, 30 mL, Oral, QDAY PRN **AND** bisacodyl (DULCOLAX) suppository 10 mg, 10 mg, Rectal, QDAY PRN, Yamel Palma M.D.  •  ondansetron (ZOFRAN) syringe/vial injection 4 mg, 4 mg, Intravenous, Q4HRS PRN, Yamel Palma M.D.  •  ondansetron (ZOFRAN ODT) dispertab 4 mg, 4 mg, Oral, Q4HRS PRN, Yamel Palma M.D.  •  promethazine (PHENERGAN) tablet 12.5-25 mg, 12.5-25 mg, Oral, Q4HRS PRN, Yamel Palma M.D.  •  promethazine (PHENERGAN) suppository 12.5-25 mg, 12.5-25 mg, Rectal, Q4HRS PRN, Yamel Palma M.D.  •  prochlorperazine (COMPAZINE) injection 5-10 mg, 5-10 mg, Intravenous, Q4HRS PRN, Yamel Palma M.D.  •  aspirin (ASA) tablet 325 mg, 325 mg,  Oral, DAILY **OR** aspirin (ASA) chewable tab 324 mg, 324 mg, Oral, DAILY **OR** aspirin (ASA) suppository 300 mg, 300 mg, Rectal, DAILY, Yamel Palma M.D.  •  heparin infusion 25,000 units in 500 mL 0.45% NACL, , Intravenous, Continuous, Last Rate: 22 mL/hr at 04/10/20 0717, 1,100 Units/hr at 04/10/20 0717 **AND** Protocol 209 Heparin Post Fibrinolytic Discontinue Enoxaparin (Lovenox), Dabigatran (Pradaxa), Rivaroxaban (Xarelto), Apixaban (Eliquis), Edoxaban (Savaysa, Lixiana), and Fondaparinux (Arixtra) and Argatroban prior to heparin administration, , , CONTINUOUS **AND** APTT, , , Q6H(S) **AND** APTT, , , TOMORROW AM (0300) **AND** RN to place APTT Orders IF, , , CONTINUOUS **AND** Protocol 209 HEPARIN LOADING DOSE - 1000 UNITS/ML GOAL APPROXIMATELY 1000 UNITS/ML, , , CONTINUOUS **AND** Protocol 209 INITIAL HEPARIN INFUSION - 25,000 UNITS  ML OF 0.45% NaCl = 50 UNITS/ML GOAL APPROXIMATELY 12 UNITS/KG/HOUR, , , CONTINUOUS **AND** Protocol 209 HEPARIN ADJUSTMENT RELOAD OR HOLD / RATE CHANGE, , , CONTINUOUS, Ze Burrell M.D.  •  hydrALAZINE (APRESOLINE) injection 10-20 mg, 10-20 mg, Intravenous, Q4HRS PRN, Jeremy M Gonda, M.D.  •  labetalol (NORMODYNE/TRANDATE) injection 10-20 mg, 10-20 mg, Intravenous, Q4HRS PRN, Jeremy M Gonda, M.D.    Physical Examination:     Vitals:    04/10/20 0400 04/10/20 0500 04/10/20 0600 04/10/20 0700   BP: 113/57 116/67 122/65 127/60   Pulse: 70 69 72 65   Resp: 12 (!) 21 (!) 23 17   Temp: 36.9 °C (98.5 °F)  36.6 °C (97.9 °F)    TempSrc: Temporal  Temporal    SpO2: 94% 95% 96% 93%   Weight: 121.3 kg (267 lb 6.7 oz)      Height:           General: Patient is awake and in no acute distress  Eyes: examination of optic disks not indicated at this time  CV: RRR    NEUROLOGICAL EXAM:     Mental status: Awake, alert and fully oriented, follows commands  Speech and language: mildly dysarthric. The patient is able to name and repeat making a few paraphasic errors  Cranial nerve  exam: Pupils are equal, round and reactive to light bilaterally. Visual fields are full. Extraocular muscles are intact. Sensation in the face is intact to light touch. Face is symmetric. Hearing to finger rub equal. Palate elevates symmetrically. Shoulder shrug is full. Tongue is midline.  Motor exam: Strength is 5/5 in all extremities both distally and proximally. Tone is normal. No abnormal movements were seen on exam.  Sensory exam: No sensory deficits identified; does not have extinction to double simultaneous stimulation  Deep tendon reflexes:  2+ and symmetric. Toes up-going on the right and downgoing on the left  Coordination: no ataxia   Gait: deferred due to ICU status    Objective Data:    Labs:  No results found for: PROTHROMBTM, INR   Lab Results   Component Value Date/Time    WBC 7.4 04/10/2020 04:30 AM    RBC 5.05 04/10/2020 04:30 AM    HEMOGLOBIN 14.1 04/10/2020 04:30 AM    HEMATOCRIT 42.9 04/10/2020 04:30 AM    MCV 85.0 04/10/2020 04:30 AM    MCH 27.9 04/10/2020 04:30 AM    MCHC 32.9 (L) 04/10/2020 04:30 AM    MPV 10.3 04/10/2020 04:30 AM    NEUTSPOLYS 73.10 (H) 04/10/2020 04:30 AM    LYMPHOCYTES 18.10 (L) 04/10/2020 04:30 AM    MONOCYTES 7.90 04/10/2020 04:30 AM    EOSINOPHILS 0.00 04/10/2020 04:30 AM    BASOPHILS 0.50 04/10/2020 04:30 AM      Lab Results   Component Value Date/Time    SODIUM 138 04/10/2020 05:30 AM    POTASSIUM 3.7 04/10/2020 05:30 AM    CHLORIDE 103 04/10/2020 05:30 AM    CO2 25 04/10/2020 05:30 AM    GLUCOSE 94 04/10/2020 05:30 AM    BUN 14 04/10/2020 05:30 AM    CREATININE 0.68 04/10/2020 05:30 AM    CREATININE 1.1 12/09/2006 01:45 PM      Lab Results   Component Value Date/Time    CHOLSTRLTOT 123 04/10/2020 05:30 AM    LDL 71 04/10/2020 05:30 AM    HDL 41 04/10/2020 05:30 AM    TRIGLYCERIDE 55 04/10/2020 05:30 AM       No results found for: ALKPHOSPHAT, ASTSGOT, ALTSGPT, TBILIRUBIN     Imaging/Testing:    I interpreted and/or reviewed the patient's neuroimaging    CT-CTA HEAD  WITH & W/O-POST PROCESS   Final Result      1.  RIGHT M2 occlusion   2.  No intracranial hemorrhage.      Discussed with Dr. Sullivan at 2148      CT-CTA NECK WITH & W/O-POST PROCESSING   Final Result      1.  Mixed density, irregular plaque in the proximal RIGHT internal carotid artery causing less than 50% stenosis   2.  Findings suggestive of ankylosing spondylitis      Discussed with Dr. Sullivan at 2148      CT-HEAD W/O    (Results Pending)   EC-ECHOCARDIOGRAM COMPLETE W/O CONT    (Results Pending)   MR-BRAIN-W/O    (Results Pending)       Assessment and Plan:    Jose Simpson is a 64 y.o. man who was transferred from [facility] 4/9/20 as a drip and ship (tPA) for L sided weakness and facial droop. Patient was found to have a R MCA large vessel occlusion but was deemed not a candidate for IA intervention given improvement of examination with repeat NIHSS of 0.  Repeat vascular imaging showed YEN/RM2 thrombus vs. Ulcerated plaque for which vascular surgery has been consulted.  This is the likely etiology the the R M2 occlusion and is currently being managed with a heparin drip.      Plan:    - ACUTE TREATMENT WITH TPA as managed with pharmacy and coordinated care  - Admit to the intensive care unit  - continue heparin drip for suspected ulcerated plaque; pending follow up consultation by vascular surgery for R CEA  - Neurology checks and vital signs per protocol; perform swallow screen  - Maintain blood pressure <180/105 per thrombolytic therapy guidelines  - obtain normoglycemia and avoid hypo- or hyper -natremia; aim for normothermia  - high intensity statin per SPARCL Trial if and when safe to swallow  - Obtain STAT head CT for acute neurologic deterioration, otherwise repeat non-contrast head CT 24 hours s/p tPA  - serum studies for stroke risk factors: lipid panel & hemoglobin A1C  - To identify stroke territory,, obtain MRI brain  --> (If the MRI brain study is completed before the 24hr post  head CT, then the head CT study can be discontinued)  - Obtain a 2D echocardiogram w/ bubble study  - evaluate and treat with PT/OT/ST  - stroke education    The evaluation of the patient, and recommended management, was discussed with the resident staff. I have performed a physical exam and reviewed and updated ROS and Plan today (4/10/2020). In review of yesterday's note (4/9/2020), there are no changes except as documented above.    Nam Bae MD  Director, [facility], [facility]  Neurohospitalist, [facility]  Clinical  of Neurology, [employer]  t) [phone] (f) [phone]

## 2020-04-10 NOTE — ED NOTES
Patient awake alert and oriented x 4, Glasc[place] 15, bed in low position, call light within reach, on room air, attached to cardiac monitor, unlabored breathing noted, no cough noted, interacts with staff, interactions noted as appropriate, talking on cell phone.

## 2020-04-10 NOTE — CARE PLAN
Problem: Safety  Goal: Will remain free from injury  Outcome: PROGRESSING AS EXPECTED     Problem: Venous Thromboembolism (VTW)/Deep Vein Thrombosis (DVT) Prevention:  Goal: Patient will participate in Venous Thrombosis (VTE)/Deep Vein Thrombosis (DVT)Prevention Measures  Outcome: PROGRESSING AS EXPECTED  NOTE: SCDs on and in place     Problem: Pain Management  Goal: Pain level will decrease to patient's comfort goal  Outcome: PROGRESSING AS EXPECTED   NOTE: Patient not complaining of pain at this time

## 2020-04-10 NOTE — DISCHARGE PLANNING
[facility] Rehabilitation Transitional Care Coordination     Referral from:   Dr. Yamel Palma  Facesheet indicates:  [employer]/Med[employer]are Insurance  Potential Rehab Diagnosis:  CVA    Presented to [facility] [place] with left-sided weakness and facial droop.  Received tPA, transferred to [facility] for IR consult.  Clinical improvement with alteplase.  MRI brain pending.  Heparin gtt.      Chart review indicates patient may have on going medical management, may have therapy needs to possibly meet inpatient rehab facility criteria with the goal of returning to community.    D/C support:  Spouse     Physiatry consultation pended per protocol while waiting for additional information to determine appropriateness for inpatient rehab.  Would welcome PT/OT evals - as clinically appropriate - to assist with determination for post acute care needs.  TCC following.        Thank you for the referral.

## 2020-04-10 NOTE — PROGRESS NOTES
Vascular    Called regarding ulcerated right ICA plaque and a right M2 occlusion which is likely acute due to symptoms    Reportedly patient has returned to clinical baseline    Patient did receive TPA within the last couple hours  He is being started on heparin infusion with no initial bolus  He will likely benefit from addition of Plavix after TPA has had adequate time to dissipate    Although ICA plaque is less than 50% stenosed he has a convincing ulcerated plaque on CTA and is likely the cause of the M2 occlusion, therefore I would consider RCEA in this patient and I will evaluate him in the morning and discuss this further    If patient has worsening symptoms overnight the intracranial circulation will need to be addressed by IR, but on this antithrombotic regimen he is not likely to have any worsening overnight.    Brady Yeboah MD  [facility] (General and Vascular Surgery)  Cell: [phone] (text or call is fine, if you don't reach me please try my office)  Office: [phone]  __________________________________________________________________  Patient:Jose Simpson   MRN:[id number]   CSN:[id number]    4/9/2020    11:25 PM

## 2020-04-10 NOTE — CONSULTS
Tele-Stroke Consultation Note:     Hospital: [facility]  Referring Physician: Buster Virk M.D.    Reason for consultation: Stroke    HPI: Jose Simpson is a 64 y.o. male with history of obesity presenting to the hospital for stroke and consulted for stroke. Patient was at home making some tea when he noted some weakness/numbness on his L side. He presented to [place] where a thrombus was found on the YEN as well as a R M2. He got TPA and was transferred here for further care. Symptoms per the patient have completely resolved however repeat imaging showed YEN thrombus as well as a R M2 lesion. At this point no intervention is planned by IR as the patient's NIHSS is 0. Currently he is quite scared about the current situation but has no other complaints.     ROS:     As above. All other systems reviewed and are negative.    Past Medical History:   Obesity    FHx:  No Fhx of stroke    SHx:   Lives at home w/ wife. Independent. Nonsmoker/non drinker.     Allergies:  No Known Allergies    Medications:    Current Facility-Administered Medications:   •  senna-docusate (PERICOLACE or SENOKOT S) 8.6-50 MG per tablet 2 Tab, 2 Tab, Oral, BID **AND** polyethylene glycol/lytes (MIRALAX) PACKET 1 Packet, 1 Packet, Oral, QDAY PRN **AND** magnesium hydroxide (MILK OF MAGNESIA) suspension 30 mL, 30 mL, Oral, QDAY PRN **AND** bisacodyl (DULCOLAX) suppository 10 mg, 10 mg, Rectal, QDAY PRN, Yamel Palma M.D.  •  ondansetron (ZOFRAN) syringe/vial injection 4 mg, 4 mg, Intravenous, Q4HRS PRN, Yamel Palma M.D.  •  ondansetron (ZOFRAN ODT) dispertab 4 mg, 4 mg, Oral, Q4HRS PRN, Yamel Palma M.D.  •  promethazine (PHENERGAN) tablet 12.5-25 mg, 12.5-25 mg, Oral, Q4HRS PRN, Yamel Palma M.D.  •  promethazine (PHENERGAN) suppository 12.5-25 mg, 12.5-25 mg, Rectal, Q4HRS PRN, Yamel Palma M.D.  •  prochlorperazine (COMPAZINE) injection 5-10 mg, 5-10 mg, Intravenous, Q4HRS PRN, [place],  "M.D.  •  [START ON 4/10/2020] aspirin (ASA) tablet 325 mg, 325 mg, Oral, DAILY **OR** [START ON 4/10/2020] aspirin (ASA) chewable tab 324 mg, 324 mg, Oral, DAILY **OR** [START ON 4/10/2020] aspirin (ASA) suppository 300 mg, 300 mg, Rectal, DAILY, Yamel Palma M.D.  •  heparin infusion 25,000 units in 500 mL 0.45% NACL, , Intravenous, Continuous, Last Rate: 20 mL/hr at 04/09/20 2226, 1,000 Units/hr at 04/09/20 2226 **AND** Protocol 209 Heparin Post Fibrinolytic Discontinue Enoxaparin (Lovenox), Dabigatran (Pradaxa), Rivaroxaban (Xarelto), Apixaban (Eliquis), Edoxaban (Savaysa, Lixiana), and Fondaparinux (Arixtra) and Argatroban prior to heparin administration, , , CONTINUOUS **AND** APTT, , , Q6H(S) **AND** [START ON 4/10/2020] APTT, , , TOMORROW AM (0300) **AND** RN to place APTT Orders IF, , , CONTINUOUS **AND** Protocol 209 HEPARIN LOADING DOSE - 1000 UNITS/ML GOAL APPROXIMATELY 1000 UNITS/ML, , , CONTINUOUS **AND** Protocol 209 INITIAL HEPARIN INFUSION - 25,000 UNITS  ML OF 0.45% NaCl = 50 UNITS/ML GOAL APPROXIMATELY 12 UNITS/KG/HOUR, , , CONTINUOUS **AND** Protocol 209 HEPARIN ADJUSTMENT RELOAD OR HOLD / RATE CHANGE, , , CONTINUOUS, Ze Burrell M.D.  •  hydrALAZINE (APRESOLINE) injection 10 mg, 10 mg, Intravenous, Q6HRS PRN, Ze Burrell M.D.    Current Outpatient Medications:   •  Diclofenac Sodium 1 % Gel, Apply 2-4 g to affected area(s) 4 times a day as needed for Moderate Pain., Disp: , Rfl:   •  COSENTYX SENSOREADY  MG/ML Solution Auto-injector, Inject 150 mg as instructed every 28 days., Disp: , Rfl:     Outpatient Medications:   (Not in a hospital admission)      Vitals:   Vitals:    04/09/20 2008 04/09/20 2011 04/09/20 2032 04/09/20 2121   BP:  143/72  131/81   Pulse:  87  95   Resp:  16  18   Temp:   36.8 °C (98.2 °F)    TempSrc:   Temporal    SpO2:  97%  95%   Weight: 123 kg (271 lb 2.7 oz)      Height: 1.702 m (5' 7\")          Labs:  No results found for: PROTHROMBTM, " INR   Lab Results   Component Value Date/Time    WBC 11.4 (H) 12/09/2006 01:45 PM    RBC 6.03 12/09/2006 01:45 PM    HEMOGLOBIN 17.9 12/09/2006 01:45 PM    HEMATOCRIT 49.7 12/09/2006 01:45 PM    MCV 82.3 12/09/2006 01:45 PM    MCH 29.7 12/09/2006 01:45 PM    MCHC 36.1 (H) 12/09/2006 01:45 PM    MPV 7.4 12/09/2006 01:45 PM    NEUTSPOLYS 85.7 (H) 12/09/2006 01:45 PM    LYMPHOCYTES 10.1 (L) 12/09/2006 01:45 PM    MONOCYTES 3.6 12/09/2006 01:45 PM    EOSINOPHILS 0.4 12/09/2006 01:45 PM    BASOPHILS 0.3 12/09/2006 01:45 PM      Lab Results   Component Value Date/Time    SODIUM 138 12/09/2006 01:45 PM    POTASSIUM 3.4 (L) 12/09/2006 01:45 PM    CHLORIDE 104 12/09/2006 01:45 PM    CO2 24 12/09/2006 01:45 PM    GLUCOSE 115 (H) 12/09/2006 01:45 PM    BUN 14 12/09/2006 01:45 PM    CREATININE 1.1 12/09/2006 01:45 PM               Imaging:  CTA Neck personally reviewed w/ evidence of YEN thrombus.       CTA Head reviewed in chart.     Physical Exam:     General: Well appearing 65 y/o male in bed in NAD.   Cardio: Unable to assess through telemedicine.   Pulm: Unable to assess through telemedicine.   Skin: Unable to assess through telemedicine.   Psychiatric: Appropriate affect. No active psychosis. Appears anxious.   HEENT: Atraumatic head, normal sclera and conjunctiva, moist oral mucosa. No lid lag.  Abdomen: Unable to assess through telemedicine.     Physical Exam:    NIH Stroke Scale:    1a. Level of Consciousness (Alert, drowsy, etc): 0= Alert    1b. LOC Questions (Month, age): 0= Answers both correctly    1c. LOC Commands (Open/close eyes make fist/let go): 0= Obeys both correctly    2.   Best Gaze (Eyes open - patient follows examiner's finger on face): 0= Normal    3.   Visual Fields (introduce visual stimulus/threat to patient's field quadrants): 0= No visual loss  4.   Facial Paresis (Show teeth, raise eyebrows and squeeze eyes shut): 0= Normal     5a. Motor Arm - Left (Elevate arm to 90 degrees if patient is  sitting, 45 degrees if  supine): 0= No drift    5b. Motor Arm - Right (Elevate arm to 90 degrees if patient is sitting, 45 degrees if supine): 0= No drift    6a. Motor Leg - Left (Elevate leg 30 degrees with patient supine): 0= No drift    6b. Motor Leg - Right  (Elevate leg 30 degrees with patient supine): 0= No drift    7.   Limb Ataxia (Finger-nose, heel down shin): 0= No ataxia    8.   Sensory (Pin prick to face, arm, trunk and leg - compare side to side): 0= Normal    9.  Best Language (Name item, describe a picture and read sentences): 0= No aphasia    10. Dysarthria (Evaluate speech clarity by patient repeating listed words): 0= Normal articulation    11. Extinction and Inattention (Use information from prior testing to identify neglect or  double simultaneous stimuli testing): 0= No neglect    Total NIH Score: 0    Assessment/Plan:    Jose Simpson is a 64 y.o. male with history of obesity presenting to the hospital for stroke and consulted for stroke. Transferred from [facility] for L sided weakness and facial droop onset at 6pm. Clot in carotid was seen and he received TPA. He was sent here for possible intervention however his symptoms improved. Repeat vascular imaging showed YEN/RM2 thrombus however since NIHSS is 0 at this juncture no thrombectomy is planned. Unfortunately, even though he has received TPA his risk of further thrombosis is high with a thrombus sitting in his YEN and in this regard will likely need a Heparin drip. I will also consult vascular surgery for question of thrombectomy/CEA. For now we will maintain SBP < 160 and use no-bolus Heparin.     Plan:  -Admit to ICU  -Heparin drip no bolus goal PTT 50-70.  -Maintain blood pressure <160/105  -Hold antiplatelets for 24 hours after TPA administration.  -Obtain STAT head CT for acute neurologic deterioration, otherwise repeat Head CT 24 hours after TPA.  -Neuro checks/vital signs per protocol.  -Obtain MRI Brain W/O CST  -Obtain  CBC/CMP/TSH/LDL/Hgb A1C  -Obtain Echocardiogram.  -Initiate smoking cessation/stroke education  -Schedule evaluation with PT/OT/ST  -If there is ANY neurological change at night w/ increase in NIHSS or focal deficits obtain a STAT CT HEAD W/O CST to R/O bleed. If there is NO bleed then call neurology/IR for possible thrombectomy.   -Plan discussed with consulting physician and patient's nurse  -Spoke w/ vascular surgery. Appears like YEN thrombus is likely an ulcerated plaque. Agree with Heparin for now, will likely need CEA before discharge.   -MR Brain W/O CST in the morning. Will need to assess stroke burden before surgery.      This consultation was conducted utilizing secure and encrypted videoconferencing equipment with the assistance of a trained tele-presenter at the originating site.      Ze Burrell M.D., Diplomat of the American Board of Psychiatry and Neurology  Diplomat of [employer] Epilepsy Subspecialty   Assistant Clinical Professor, [facility] Neurology Consultant

## 2020-04-10 NOTE — ED NOTES
Med Rec Updated and Complete per Pt's Home Pharmacy  Allergies Reviewed Historically  No PO ABX last 14 days.    Pt unreachable by phone at this time.    Unknown last doses.

## 2020-04-10 NOTE — PROGRESS NOTES
Neuro interventional progress note:    64-year-old male transferred from [facility] to [facility] for possible mechanical thymectomy. The CTA done on [facility] demonstrates occluded dominant M2 segment. There is also small intraluminal thrombus at the proximal portion of the right internal carotid artery.    According to the ER physician, the patient's symptoms are near completely resolved. He is able to move both upper and lower extremities without any significant difference. There is also no significant dysarthria. Therefore a decision was made not to intervene. The repeat CT angiogram again demonstrates occluded right M2 segment with non flow limiting  intraluminal thrombus at the proximal ICA.    Neuro interventional recommendation:  No mechanical thrombectomy is indicated at this time due to the significant improvement in his symptoms following TPA.    Please call neuro interventional service if there is any worsening of his neurological status.

## 2020-04-10 NOTE — ASSESSMENT & PLAN NOTE
S/p alteplase  RMCA M2 segement likely embolic from ipsilateral carotid artery occlusion  Dr. Burrell will consult for neurology  IR has evaluated, no clot retrieval indicated with clinical improvement on alteplase  S/p CEA 4/12  Statin  BP control  DC on DAPT

## 2020-04-10 NOTE — PROGRESS NOTES
Critical Care Progress Note    Date of admission  4/9/2020    Chief Complaint  64 y.o. male admitted 4/9/2020 with AIS s/p tpa    Hospital Course    64 y.o. male who presented 4/9/2020 with diet-controlled borderline diabetes, ankylosing spondylitis, and obesity who was in his usual state of health this evening when approximately 1900 while making his dinner he developed sudden onset of left-sided weakness, facial droop and speech difficulties.  His wife called 911 and he was brought into the emergency department at [facility] where he underwent imaging.  He was given TPA and was found to have a right ICA and right M2 occlusion for which she was transferred to [facility] for neuro intervention.  While being transferred to [facility] his symptoms began to resolve and upon arrival to the ED here the decision was made not to intervene on the clot given improvement in symptoms and risk status post TPA.  Vascular surgery was consulted and he was started on a heparin drip and being admitted to the intensive care unit in critical condition.  I was consulted for his critical care management.  Patient denies any recent trauma, chiropractic manipulation, prior stroke, recent illness.  He is left hand dominant.         Interval Problem Update  Reviewed last 24 hour events:  - Heparin gtt; R ICA thrombus   - Neuro: GCs 15; NIHSS 0   - HR: 60-70s   - SBP: 110-120s   - GI: advance diet as tolerated   - UOP: 1L   - Beebe: mo   - Tm: 37.1   - Lines: PIVs   - PPx: GI not indicated, DVT heparin gtt   - CXR (personally reviewed and compared to prior): no acute cardiopulmonary process      Review of Systems  Review of Systems   Constitutional: Negative for chills and fever.   HENT: Negative for sore throat.    Eyes: Negative.    Respiratory: Negative for cough and shortness of breath.    Cardiovascular: Negative for chest pain.   Gastrointestinal: Positive for heartburn. Negative for abdominal pain, nausea and vomiting.    Genitourinary: Negative.    Musculoskeletal: Negative.    Neurological: Negative for speech change and weakness.   Psychiatric/Behavioral: Negative.         Vital Signs for last 24 hours   Temp:  [36.6 °C (97.9 °F)-37.1 °C (98.7 °F)] 36.6 °C (97.9 °F)  Pulse:  [] 65  Resp:  [12-25] 17  BP: (113-151)/(49-84) 127/60  SpO2:  [90 %-97 %] 93 %    Hemodynamic parameters for last 24 hours       Respiratory Information for the last 24 hours       Physical Exam   Physical Exam  Constitutional:       General: He is not in acute distress.     Appearance: He is obese.   HENT:      Head: Normocephalic and atraumatic.      Right Ear: External ear normal.      Left Ear: External ear normal.      Nose: Nose normal.      Mouth/Throat:      Mouth: Mucous membranes are moist.      Pharynx: Oropharynx is clear. No oropharyngeal exudate.   Eyes:      Extraocular Movements: Extraocular movements intact.      Pupils: Pupils are equal, round, and reactive to light.   Cardiovascular:      Rate and Rhythm: Normal rate and regular rhythm.      Pulses: Normal pulses.   Pulmonary:      Effort: Pulmonary effort is normal.   Abdominal:      Tenderness: There is no abdominal tenderness.   Musculoskeletal:         General: No swelling.   Skin:     General: Skin is warm and dry.   Neurological:      Mental Status: He is alert.      Comments: GCS 15, NIHSS 0. CN2-12 intact. No drift in all 4 extremities.   Psychiatric:         Mood and Affect: Mood normal.         Behavior: Behavior normal.         Medications  Current Facility-Administered Medications   Medication Dose Route Frequency Provider Last Rate Last Dose   • NS infusion   Intravenous Continuous Jeremy M Gonda, M.D. 83 mL/hr at 04/10/20 0030     • senna-docusate (PERICOLACE or SENOKOT S) 8.6-50 MG per tablet 2 Tab  2 Tab Oral BID Yamel Palma M.D.        And   • polyethylene glycol/lytes (MIRALAX) PACKET 1 Packet  1 Packet Oral QDAY PRN Yamel Palma M.D.        And   • magnesium  hydroxide (MILK OF MAGNESIA) suspension 30 mL  30 mL Oral QDAY PRN Yamel Palma M.D.        And   • bisacodyl (DULCOLAX) suppository 10 mg  10 mg Rectal QDAY PRN Yamel Palma M.D.       • ondansetron (ZOFRAN) syringe/vial injection 4 mg  4 mg Intravenous Q4HRS PRSHARON Palma M.D.       • ondansetron (ZOFRAN ODT) dispertab 4 mg  4 mg Oral Q4HRS PRN Yamel Palma M.D.       • promethazine (PHENERGAN) tablet 12.5-25 mg  12.5-25 mg Oral Q4HRS PRSHARON Palma M.D.       • promethazine (PHENERGAN) suppository 12.5-25 mg  12.5-25 mg Rectal Q4HRS PRSHARON Palma M.D.       • prochlorperazine (COMPAZINE) injection 5-10 mg  5-10 mg Intravenous Q4HRS PRSHARON Palma M.D.       • aspirin (ASA) tablet 325 mg  325 mg Oral DAILY Yamel Palma M.D.        Or   • aspirin (ASA) chewable tab 324 mg  324 mg Oral DAILY Yamel Palma M.D.        Or   • aspirin (ASA) suppository 300 mg  300 mg Rectal DAILY Ymael Palma M.D.       • heparin infusion 25,000 units in 500 mL 0.45% NACL   Intravenous Continuous Ze Burrell M.D. 22 mL/hr at 04/10/20 0717 1,100 Units/hr at 04/10/20 0717   • hydrALAZINE (APRESOLINE) injection 10-20 mg  10-20 mg Intravenous Q4HRS PRN Jeremy M Gonda, M.D.       • labetalol (NORMODYNE/TRANDATE) injection 10-20 mg  10-20 mg Intravenous Q4HRS PRN Jeremy M Gonda, M.D.           Fluids    Intake/Output Summary (Last 24 hours) at 4/10/2020 0749  Last data filed at 4/10/2020 0600  Gross per 24 hour   Intake 608.83 ml   Output 1000 ml   Net -391.17 ml       Laboratory          Recent Labs     04/10/20  0530   SODIUM 138   POTASSIUM 3.7   CHLORIDE 103   CO2 25   BUN 14   CREATININE 0.68   CALCIUM 9.0     Recent Labs     04/10/20  0530   GLUCOSE 94     Recent Labs     04/10/20  0430   WBC 7.4   NEUTSPOLYS 73.10*   LYMPHOCYTES 18.10*   MONOCYTES 7.90   EOSINOPHILS 0.00   BASOPHILS 0.50     Recent Labs     04/09/20  2220 04/10/20  0430   RBC  --  5.05   HEMOGLOBIN  --  14.1   HEMATOCRIT  --   42.9   PLATELETCT  --  208   APTT 31.1 39.8*       Imaging  CT:    Reviewed  R distal ICA ulcerated plaque on CTA    Assessment/Plan  * Cerebrovascular accident (CVA) due to occlusion of right middle cerebral artery (HCC)- (present on admission)  Assessment & Plan  With a right MCA syndrome status post TPA 4/9 at 1900  Neurology consultation  Strict blood pressure control with goal SBP less than 180, DBP less than 105  Hold ASA while on heparin gtt  Echocardiography, lipid panel, hemoglobin A1c  MRI brain  Repeat CT head without contrast 24 hours post TPA  PT/OT/SLP  Close neurologic monitoring    Occlusion of right carotid artery- (present on admission)  Assessment & Plan  With ulcerating plaque as likely cause of CVA  Vascular surgery consultation  Current titration of heparin gtt  Possible right CEA in the near future; will check MRI to evaluate stroke topography prior.    Class 3 severe obesity due to excess calories with serious comorbidity in adult ([facility])- (present on admission)  Assessment & Plan  Dietary and behavioral modifications to encourage weight loss  RD consultation    Ankylosing spondylitis ([facility])- (present on admission)  Assessment & Plan  Hold antiplatelet agent for now while on heparin gtt       VTE:  Heparin  Ulcer: Not Indicated  Lines: None    I have performed a physical exam and reviewed and updated ROS and Plan today (4/10/2020). In review of yesterday's note (4/9/2020), there are no changes except as documented above.     Discussed patient condition and risk of morbidity and/or mortality with RN, RT, Pharmacy, Code status disscussed, Charge nurse / hot rounds, Patient and neurology  The patient remains critically ill. He is s/p tpa and  now requires titration of a heparin gtt for an unstable ulcerated carotid plaque. He is at his at high risk for CNS decompensation and requires vigilant monitoring by ICU nurses and the intensivist. Critical care time = 35 minutes in directly providing and  coordinating critical care and extensive data review.  No time overlap and excludes procedures.

## 2020-04-10 NOTE — PROGRESS NOTES
Brief Neurology Note    The patient's chart has been reviewed. I interpreted the brain MRI showing mild right sided infarct in the insula and corona radiata.  Plan for right ICA CEA pending scheduling with Dr. Yeboah; I provided him this update via [employer].  Given size and territory of infarction, it is not necessary to wait 2-4 weeks.  Patient is currently managed on heparin drip with the addition of Plavix by Dr. Yeobah today.    Nam Bae MD  Director, Comprehensive Stroke Center, [facility]  Neurohospitalist, [facility] Neurosciences  Clinical  of Neurology, [employer]  t) [phone] (f) [phone]

## 2020-04-10 NOTE — ASSESSMENT & PLAN NOTE
[facility] post TPA 4/9 at 1900 NIHSS 0  S/p heparin  Plavix/ASA  Atorvastatin  MRI brain - Small area of acute infarct in the right insular cortex and right frontal lobe  Echo - nl EF  PT/OT/SLP  Ok to transfer to neuro floor

## 2020-04-10 NOTE — ED NOTES
Patient awake alert and oriented x 4, Gl[place] 15, bed in low position, call light within reach, on room air, attached to cardiac monitor, unlabored breathing noted, no cough noted, interacts with staff, interactions noted as appropriate, watching television and using cell phone occasionally.

## 2020-04-10 NOTE — PROGRESS NOTES
Pt admitted to R117. Placed on ICU monitor. VSS. Alert and Oriented x4. NIHS 0.    2 RN skin check with CRISTOFER Martinez.   Heels calloused, flaky. Elevated on pillows.   Sacrum red, blanching. Mepilex placed. Pillows in use for q2 turns.   Elbows and bony prominences intact. Pillows in use for support.   No other pressure areas of concern. All appropriate skin preventative measures in place.

## 2020-04-10 NOTE — THERAPY
"Speech Language Therapy Clinical Swallow Evaluation completed.  Functional Status: Pt was A&Ox4, cooperative, verbalizing good recall of recent events and his current status.  Oral [facility] exam was notable for lingual deviation to the L only. Dentition is grossly intact with a few missing teeth. Vocal quality was clear. Pt was presented with ice chips (4), mildly thick liquids via tsp/cup (MT2), thins via tsp/cup (TN0), Liquidised Food (LQ3), Pureed Solids (PU4), Soft & Bite Sized Solids (SB6) and Regular Solids (RG7). Pt was able to self-feed small bites and sips independently. Oral phase was WFL. Pharyngeal swallow response was timely. Hyolaryngeal excursion was palpated as complete. No s/sx of aspiration appreciated with PO intake.  Recommend initiating a diet of regular solids/thin liquids with initial direct supervision for meals, given pt is at risk for neurologic changes s/p tPA. Please hold PO with any overt s/sx of aspiration and/or change in status and page SLP.    Recommendations - Diet: Regular (7), Thin (0)                          Strategies: Direct supervision during meals and Head of Bed at 90 Degrees                          Medication Administration: Medication Administration : Whole with Liquid Wash  Plan of Care: Will benefit from Speech Therapy 3 times per week  Post-Acute Therapy: Anticipate that the patient will have no further speech therapy needs after discharge from the hospital.      See \"Rehab Therapy-Acute\" Patient Summary Report for complete documentation.   "

## 2020-04-10 NOTE — CONSULTS
Called by Dr. Virk    Jose Simpson is a 63 y/o male transferred from [facility] for L sided weakness and facial droop onset at 6pm. Clot in carotid was seen and he received TPA. He was sent here for possible intervention however his symptoms improved, so at this point no thrombectomy is planned. I reviewed the images from [facility] and clot is visible in the R common carotid. It is possible his neurologic symptoms are secondary to a thrombus breaking off and going into the CNS however his symptoms have resolved but it is unclear if the thrombus still remains in the carotid. It will be important to delineate if a thrombus is still present regarding therapy.     Plan:  -Repeat CTA Head/Neck now. Pending on findings further recs to follow.   -Admit to ICU  -Maintain blood pressure <180/105  -Hold antiplatelets for 24 hours after TPA administration.  -Obtain STAT head CT for acute neurologic deterioration, otherwise repeat Head CT 24 hours after TPA.  -Neuro checks/vital signs per protocol.  -Obtain MRI Brain W/O CST  -Obtain CBC/CMP/TSH/LDL/Hgb A1C  -Obtain Echocardiogram.  -Initiate smoking cessation/stroke education  -Schedule evaluation with PT/OT/ST  -Formal consult in the am.             10pm Addendum:     I reviewed the CTA Head/Neck and discussed w/ Dr. Sullivan. He still appears to have a free floating thrombus in the carotid artery, as well as a RM2 occlusion. That being said per Dr. Virk he appears to be asymptomatic/resolved. Given his thrombus in his carotid though this may be high risk for further embolus/stroke. After discussion w/ Dr. Sullivan even though it is higher risk due to him having gotten TPA recently he likely will need to be on a heparin drip. I would recommend no bolus, and to maintain PTT 50-70. If there is ANY neurological change at night w/ increase in NIHSS or focal deficits obtain a STAT CT HEAD W/O CST to R/O bleed. If there is NO bleed then call neurology/IR for possible  thrombectomy.     Above was discussed w/ Dr. Virk and Intensivist on call.

## 2020-04-11 ENCOUNTER — APPOINTMENT (OUTPATIENT)
Dept: RADIOLOGY | Facility: MEDICAL CENTER | Age: 65
DRG: 038 | End: 2020-04-11
Attending: INTERNAL MEDICINE
Payer: COMMERCIAL

## 2020-04-11 LAB
ANION GAP SERPL CALC-SCNC: 10 MMOL/L (ref 7–16)
APTT PPP: 61.2 SEC (ref 24.7–36)
APTT PPP: 71.7 SEC (ref 24.7–36)
BASOPHILS # BLD AUTO: 0.6 % (ref 0–1.8)
BASOPHILS # BLD: 0.04 K/UL (ref 0–0.12)
BUN SERPL-MCNC: 14 MG/DL (ref 8–22)
CALCIUM SERPL-MCNC: 8.8 MG/DL (ref 8.5–10.5)
CHLORIDE SERPL-SCNC: 105 MMOL/L (ref 96–112)
CO2 SERPL-SCNC: 22 MMOL/L (ref 20–33)
CREAT SERPL-MCNC: 0.64 MG/DL (ref 0.5–1.4)
EOSINOPHIL # BLD AUTO: 0 K/UL (ref 0–0.51)
EOSINOPHIL NFR BLD: 0 % (ref 0–6.9)
ERYTHROCYTE [DISTWIDTH] IN BLOOD BY AUTOMATED COUNT: 42.6 FL (ref 35.9–50)
GLUCOSE SERPL-MCNC: 91 MG/DL (ref 65–99)
HCT VFR BLD AUTO: 45 % (ref 42–52)
HGB BLD-MCNC: 14.6 G/DL (ref 14–18)
IMM GRANULOCYTES # BLD AUTO: 0.04 K/UL (ref 0–0.11)
IMM GRANULOCYTES NFR BLD AUTO: 0.6 % (ref 0–0.9)
LYMPHOCYTES # BLD AUTO: 1.46 K/UL (ref 1–4.8)
LYMPHOCYTES NFR BLD: 21.5 % (ref 22–41)
MCH RBC QN AUTO: 27.9 PG (ref 27–33)
MCHC RBC AUTO-ENTMCNC: 32.4 G/DL (ref 33.7–35.3)
MCV RBC AUTO: 85.9 FL (ref 81.4–97.8)
MONOCYTES # BLD AUTO: 0.57 K/UL (ref 0–0.85)
MONOCYTES NFR BLD AUTO: 8.4 % (ref 0–13.4)
NEUTROPHILS # BLD AUTO: 4.68 K/UL (ref 1.82–7.42)
NEUTROPHILS NFR BLD: 68.9 % (ref 44–72)
NRBC # BLD AUTO: 0 K/UL
NRBC BLD-RTO: 0 /100 WBC
PLATELET # BLD AUTO: 178 K/UL (ref 164–446)
PMV BLD AUTO: 9.7 FL (ref 9–12.9)
POTASSIUM SERPL-SCNC: 3.8 MMOL/L (ref 3.6–5.5)
RBC # BLD AUTO: 5.24 M/UL (ref 4.7–6.1)
SODIUM SERPL-SCNC: 137 MMOL/L (ref 135–145)
WBC # BLD AUTO: 6.8 K/UL (ref 4.8–10.8)

## 2020-04-11 PROCEDURE — 70450 CT HEAD/BRAIN W/O DYE: CPT

## 2020-04-11 PROCEDURE — 80048 BASIC METABOLIC PNL TOTAL CA: CPT

## 2020-04-11 PROCEDURE — 770022 HCHG ROOM/CARE - ICU (200)

## 2020-04-11 PROCEDURE — 700102 HCHG RX REV CODE 250 W/ 637 OVERRIDE(OP): Performed by: PSYCHIATRY & NEUROLOGY

## 2020-04-11 PROCEDURE — 85730 THROMBOPLASTIN TIME PARTIAL: CPT

## 2020-04-11 PROCEDURE — A9270 NON-COVERED ITEM OR SERVICE: HCPCS | Performed by: PSYCHIATRY & NEUROLOGY

## 2020-04-11 PROCEDURE — 700102 HCHG RX REV CODE 250 W/ 637 OVERRIDE(OP): Performed by: SURGERY

## 2020-04-11 PROCEDURE — 99291 CRITICAL CARE FIRST HOUR: CPT | Performed by: PSYCHIATRY & NEUROLOGY

## 2020-04-11 PROCEDURE — A9270 NON-COVERED ITEM OR SERVICE: HCPCS | Performed by: SURGERY

## 2020-04-11 PROCEDURE — 85025 COMPLETE CBC W/AUTO DIFF WBC: CPT

## 2020-04-11 PROCEDURE — A9270 NON-COVERED ITEM OR SERVICE: HCPCS | Performed by: HOSPITALIST

## 2020-04-11 PROCEDURE — 700105 HCHG RX REV CODE 258: Performed by: INTERNAL MEDICINE

## 2020-04-11 PROCEDURE — 700102 HCHG RX REV CODE 250 W/ 637 OVERRIDE(OP): Performed by: INTERNAL MEDICINE

## 2020-04-11 PROCEDURE — 700102 HCHG RX REV CODE 250 W/ 637 OVERRIDE(OP): Performed by: HOSPITALIST

## 2020-04-11 PROCEDURE — 700111 HCHG RX REV CODE 636 W/ 250 OVERRIDE (IP): Performed by: PSYCHIATRY & NEUROLOGY

## 2020-04-11 PROCEDURE — A9270 NON-COVERED ITEM OR SERVICE: HCPCS | Performed by: INTERNAL MEDICINE

## 2020-04-11 RX ORDER — DIPHENHYDRAMINE HCL 25 MG
25-50 TABLET ORAL NIGHTLY PRN
Status: DISCONTINUED | OUTPATIENT
Start: 2020-04-11 | End: 2020-04-14 | Stop reason: HOSPADM

## 2020-04-11 RX ORDER — ATORVASTATIN CALCIUM 40 MG/1
40 TABLET, FILM COATED ORAL EVERY EVENING
Status: DISCONTINUED | OUTPATIENT
Start: 2020-04-11 | End: 2020-04-14 | Stop reason: HOSPADM

## 2020-04-11 RX ADMIN — HEPARIN SODIUM 1150 UNITS/HR: 5000 INJECTION, SOLUTION INTRAVENOUS at 00:43

## 2020-04-11 RX ADMIN — CLOPIDOGREL BISULFATE 75 MG: 75 TABLET ORAL at 17:23

## 2020-04-11 RX ADMIN — SODIUM CHLORIDE: 9 INJECTION, SOLUTION INTRAVENOUS at 00:43

## 2020-04-11 RX ADMIN — SENNOSIDES AND DOCUSATE SODIUM 2 TABLET: 8.6; 5 TABLET ORAL at 05:44

## 2020-04-11 RX ADMIN — SODIUM CHLORIDE: 9 INJECTION, SOLUTION INTRAVENOUS at 12:39

## 2020-04-11 RX ADMIN — HEPARIN SODIUM 1150 UNITS/HR: 5000 INJECTION, SOLUTION INTRAVENOUS at 23:58

## 2020-04-11 RX ADMIN — ATORVASTATIN CALCIUM 40 MG: 40 TABLET, FILM COATED ORAL at 17:22

## 2020-04-11 RX ADMIN — DIPHENHYDRAMINE HYDROCHLORIDE 25 MG: 25 TABLET ORAL at 23:54

## 2020-04-11 RX ADMIN — SENNOSIDES AND DOCUSATE SODIUM 2 TABLET: 8.6; 5 TABLET ORAL at 17:22

## 2020-04-11 ASSESSMENT — ENCOUNTER SYMPTOMS
CHILLS: 0
HEADACHES: 0
MUSCULOSKELETAL NEGATIVE: 1
SPEECH CHANGE: 0
NAUSEA: 0
FEVER: 0
EYES NEGATIVE: 1
SORE THROAT: 0
ABDOMINAL PAIN: 0
COUGH: 0
WEAKNESS: 0
HEARTBURN: 0
VOMITING: 0
SHORTNESS OF BREATH: 0
NERVOUS/ANXIOUS: 1

## 2020-04-11 NOTE — PROGRESS NOTES
Vascular    MRI results reviewed with neurology    There is a small stroke.  However the area involved is small enough that we can proceed with right carotid endarterectomy anytime.    Most likely the right carotid endarterectomy will be done either Sunday or Monday depending on or availability.    Brady Yeboah MD  [facility] (General and Vascular Surgery)  Cell: [phone] (text or call is fine, if you don't reach me please try my office)  Office: [phone]  __________________________________________________________________  Patient:oJse Simpson   MRN:[id number]   CSN:[id number]    4/10/2020    7:27 PM

## 2020-04-11 NOTE — PROGRESS NOTES
Critical Care Progress Note    Date of admission  4/9/2020    Chief Complaint  64 y.o. male admitted 4/9/2020 with AIS s/p tpa    Hospital Course    64 y.o. male who presented 4/9/2020 with diet-controlled borderline diabetes, ankylosing spondylitis, and obesity who was in his usual state of health this evening when approximately 1900 while making his dinner he developed sudden onset of left-sided weakness, facial droop and speech difficulties.  His wife called 911 and he was brought into the emergency department at [facility] where he underwent imaging.  He was given TPA and was found to have a right ICA and right M2 occlusion for which she was transferred to [facility] for neuro intervention.  While being transferred to [facility] his symptoms began to resolve and upon arrival to the ED here the decision was made not to intervene on the clot given improvement in symptoms and risk status post TPA.  Vascular surgery was consulted and he was started on a heparin drip and being admitted to the intensive care unit in critical condition.  I was consulted for his critical care management.  Patient denies any recent trauma, chiropractic manipulation, prior stroke, recent illness.  He is left hand dominant.         Interval Problem Update  Reviewed last 24 hour events:   - Seen by Vasc surg; plan for R CEA 4/12 or 4/13 pending OR availability  - Heparin gtt; R ICA thrombus   - Neuro: GCs 15; NIHSS 0   - HR: 60s   - SBP: 100-140s   - GI: regular diet   - UOP: 3.9L   - Beebe: no   - Tm: 36.8   - Lines: PIVs   - PPx: GI not indicated, DVT heparin gtt   - CXR (personally reviewed and compared to prior): no acute cardiopulmonary process   - A1c 5.7   - statin; Plavix      Review of Systems  Review of Systems   Constitutional: Negative for chills and fever.   HENT: Negative for sore throat.    Eyes: Negative.    Respiratory: Negative for cough and shortness of breath.    Cardiovascular: Negative for chest pain.    Gastrointestinal: Negative for abdominal pain, heartburn, nausea and vomiting.   Genitourinary: Negative.    Musculoskeletal: Negative.    Neurological: Negative for speech change, weakness and headaches.   Psychiatric/Behavioral: The patient is nervous/anxious.         Vital Signs for last 24 hours   Temp:  [36.2 °C (97.2 °F)-36.8 °C (98.2 °F)] 36.8 °C (98.2 °F)  Pulse:  [57-81] 61  Resp:  [14-30] 20  BP: (106-158)/(53-73) 106/56  SpO2:  [89 %-99 %] 97 %    Hemodynamic parameters for last 24 hours       Respiratory Information for the last 24 hours       Physical Exam   Physical Exam  Constitutional:       General: He is not in acute distress.     Appearance: He is obese.   HENT:      Head: Normocephalic and atraumatic.      Right Ear: External ear normal.      Left Ear: External ear normal.      Nose: Nose normal.      Mouth/Throat:      Mouth: Mucous membranes are moist.      Pharynx: Oropharynx is clear. No oropharyngeal exudate.   Eyes:      Extraocular Movements: Extraocular movements intact.      Pupils: Pupils are equal, round, and reactive to light.   Cardiovascular:      Rate and Rhythm: Normal rate and regular rhythm.      Pulses: Normal pulses.   Pulmonary:      Effort: Pulmonary effort is normal.   Abdominal:      Tenderness: There is no abdominal tenderness.   Musculoskeletal:         General: No swelling.   Skin:     General: Skin is warm and dry.   Neurological:      Mental Status: He is alert.      Comments: GCS 15, NIHSS 0. CN2-12 intact. No drift in all 4 extremities.   Psychiatric:         Mood and Affect: Mood normal.         Behavior: Behavior normal.         Medications  Current Facility-Administered Medications   Medication Dose Route Frequency Provider Last Rate Last Dose   • NS infusion   Intravenous Continuous Jeremy M Gonda, M.D. 83 mL/hr at 04/11/20 0043     • labetalol (NORMODYNE/TRANDATE) injection 10-20 mg  10-20 mg Intravenous Q4HRS PRN Librado Villar M.D.       • hydrALAZINE  (APRESOLINE) injection 10-20 mg  10-20 mg Intravenous Q4HRS PRN Librado Villar M.D.       • clopidogrel (PLAVIX) tablet 75 mg  75 mg Oral DAILY rBady Yeboah M.D.   75 mg at 04/10/20 2233   • senna-docusate (PERICOLACE or SENOKOT S) 8.6-50 MG per tablet 2 Tab  2 Tab Oral BID Yamel Palma M.D.   2 Tab at 04/11/20 0544    And   • polyethylene glycol/lytes (MIRALAX) PACKET 1 Packet  1 Packet Oral QDAY PRN Yamel Palma M.D.        And   • magnesium hydroxide (MILK OF MAGNESIA) suspension 30 mL  30 mL Oral QDAY PRSHARON Palma M.D.        And   • bisacodyl (DULCOLAX) suppository 10 mg  10 mg Rectal QDAY PRSHARON Palma M.D.       • ondansetron (ZOFRAN) syringe/vial injection 4 mg  4 mg Intravenous Q4HRS PRSHARON Palma M.D.       • ondansetron (ZOFRAN ODT) dispertab 4 mg  4 mg Oral Q4HRS PRSHARON Palma M.D.       • promethazine (PHENERGAN) tablet 12.5-25 mg  12.5-25 mg Oral Q4HRS PRSHARON Palma M.D.       • promethazine (PHENERGAN) suppository 12.5-25 mg  12.5-25 mg Rectal Q4HRS PRSHARON Palma M.D.       • prochlorperazine (COMPAZINE) injection 5-10 mg  5-10 mg Intravenous Q4HRS PRSHARON Palma M.D.       • heparin infusion 25,000 units in 500 mL 0.45% NACL   Intravenous Continuous Ze Burrell M.D. 23 mL/hr at 04/11/20 0740 1,150 Units/hr at 04/11/20 0740       Fluids    Intake/Output Summary (Last 24 hours) at 4/11/2020 0742  Last data filed at 4/11/2020 0600  Gross per 24 hour   Intake 3599.12 ml   Output 3900 ml   Net -300.88 ml       Laboratory          Recent Labs     04/10/20  0530 04/11/20  0509   SODIUM 138 137   POTASSIUM 3.7 3.8   CHLORIDE 103 105   CO2 25 22   BUN 14 14   CREATININE 0.68 0.64   CALCIUM 9.0 8.8     Recent Labs     04/10/20  0530 04/11/20  0509   GLUCOSE 94 91     Recent Labs     04/10/20  0430 04/11/20  0509   WBC 7.4 6.8   NEUTSPOLYS 73.10* 68.90   LYMPHOCYTES 18.10* 21.50*   MONOCYTES 7.90 8.40   EOSINOPHILS 0.00 0.00   BASOPHILS 0.50 0.60      Recent Labs     04/10/20  0430  04/10/20  1817 04/11/20  0053 04/11/20  0509 04/11/20  0645   RBC 5.05  --   --   --  5.24  --    HEMOGLOBIN 14.1  --   --   --  14.6  --    HEMATOCRIT 42.9  --   --   --  45.0  --    PLATELETCT 208  --   --   --  178  --    APTT 39.8*   < > 51.3* 61.2*  --  71.7*    < > = values in this interval not displayed.       Imaging  CT:    Reviewed  R distal ICA ulcerated plaque on CTA    Assessment/Plan  * Cerebrovascular accident (CVA) due to occlusion of right middle cerebral artery (HCC)- (present on admission)  Assessment & Plan  With a right MCA syndrome status post TPA 4/9 at 1900 NIHSS 0  Can aim for lower SBP now > 24h post tpa  Heparin gtt  Plavix  Atorvastatin  MRI brain - Small area of acute infarct in the right insular cortex and right frontal lobe  Echo - nl EF  PT/OT/SLP  Close neurologic monitoring    Occlusion of right carotid artery- (present on admission)  Assessment & Plan  Ulcerating plaque as likely cause of AIS  Vascular plans for CEA 4/12 or 4/13  Plavix daily  Current titration of heparin gtt      Class 3 severe obesity due to excess calories with serious comorbidity in adult (HCC)- (present on admission)  Assessment & Plan  Dietary and behavioral modifications to encourage weight loss  RD consultation    Ankylosing spondylitis (HCC)- (present on admission)  Assessment & Plan  His wife will bring in his monthly injectable medication on the day that is due 4/13 for administration       VTE:  Heparin  Ulcer: Not Indicated  Lines: None    I have performed a physical exam and reviewed and updated ROS and Plan today (4/11/2020). In review of yesterday's note (4/10/2020), there are no changes except as documented above.     Discussed patient condition and risk of morbidity and/or mortality with RN, RT, Pharmacy, Code status disscussed, Charge nurse / hot rounds, Patient and neurology     The patient remains critically ill. He requires titration of a heparin gtt for an  unstable ulcerated carotid plaque. He is at his at high risk for CNS decompensation, further stroke and requires vigilant monitoring by ICU nurses and the intensivist. Critical care time = 32 minutes in directly providing and coordinating critical care and extensive data review.  No time overlap and excludes procedures.

## 2020-04-11 NOTE — PROGRESS NOTES
Neurology Progress Note  Neurohospitalist Service, [facility]    Referring Physician: Librado Villar M.D.    Chief Complaint   Patient presents with   • Possible Stroke     Transfer from [facility], TPA infusion completed at time of arrival, transfer for Interventional Radiology       HPI: Refer to initial documented Neurology H&P, as detailed in the patient's chart.    Interval History 4/11/20: No acute events overnight.  Remains in ICU level of care.  No complaints this AM.  Pending CEA of R ICA.     Review of systems: In addition to what is detailed in the HPI and/or updated in the interval history, all other systems reviewed and are negative.    Past Medical History:    has no past medical history on file.    FHx:  family history is not on file.    SHx:   reports that he has never smoked. He has never used smokeless tobacco. He reports that he does not drink alcohol or use drugs.    Medications:    Current Facility-Administered Medications:   •  atorvastatin (LIPITOR) tablet 40 mg, 40 mg, Oral, Q EVENING, Librado Villar M.D.  •  NS infusion, , Intravenous, Continuous, Jeremy M Gonda, M.D., Last Rate: 83 mL/hr at 04/11/20 0043  •  labetalol (NORMODYNE/TRANDATE) injection 10-20 mg, 10-20 mg, Intravenous, Q4HRS PRN, Librado Villar M.D.  •  hydrALAZINE (APRESOLINE) injection 10-20 mg, 10-20 mg, Intravenous, Q4HRS PRN, Librado Villar M.D.  •  clopidogrel (PLAVIX) tablet 75 mg, 75 mg, Oral, DAILY, Brady Yeboah M.D., 75 mg at 04/10/20 2233  •  senna-docusate (PERICOLACE or SENOKOT S) 8.6-50 MG per tablet 2 Tab, 2 Tab, Oral, BID, 2 Tab at 04/11/20 0583 **AND** polyethylene glycol/lytes (MIRALAX) PACKET 1 Packet, 1 Packet, Oral, QDAY PRN **AND** magnesium hydroxide (MILK OF MAGNESIA) suspension 30 mL, 30 mL, Oral, QDAY PRN **AND** bisacodyl (DULCOLAX) suppository 10 mg, 10 mg, Rectal, QDAY PRN, Yamel Palma M.D.  •  ondansetron (ZOFRAN) syringe/vial injection 4 mg, 4 mg,  Intravenous, Q4HRS PRNYamel M.D.  •  ondansetron (ZOFRAN ODT) dispertab 4 mg, 4 mg, Oral, Q4HRS PRNYamel M.D.  •  promethazine (PHENERGAN) tablet 12.5-25 mg, 12.5-25 mg, Oral, Q4HRS PRYamel HERRERA M.D.  •  promethazine (PHENERGAN) suppository 12.5-25 mg, 12.5-25 mg, Rectal, Q4HRS PRNYamel M.D.  •  prochlorperazine (COMPAZINE) injection 5-10 mg, 5-10 mg, Intravenous, Q4HRS PRNYamel M.D.  •  heparin infusion 25,000 units in 500 mL 0.45% NACL, , Intravenous, Continuous, Last Rate: 23 mL/hr at 04/11/20 0740, 1,150 Units/hr at 04/11/20 0740 **AND** Protocol 209 Heparin Post Fibrinolytic Discontinue Enoxaparin (Lovenox), Dabigatran (Pradaxa), Rivaroxaban (Xarelto), Apixaban (Eliquis), Edoxaban (Savaysa, Lixiana), and Fondaparinux (Arixtra) and Argatroban prior to heparin administration, , , CONTINUOUS **AND** APTT, , , Q6H(S) **AND** APTT, , , TOMORROW AM (0300) **AND** RN to place APTT Orders IF, , , CONTINUOUS **AND** Protocol 209 HEPARIN LOADING DOSE - 1000 UNITS/ML GOAL APPROXIMATELY 1000 UNITS/ML, , , CONTINUOUS **AND** Protocol 209 INITIAL HEPARIN INFUSION - 25,000 UNITS  ML OF 0.45% NaCl = 50 UNITS/ML GOAL APPROXIMATELY 12 UNITS/KG/HOUR, , , CONTINUOUS **AND** Protocol 209 HEPARIN ADJUSTMENT RELOAD OR HOLD / RATE CHANGE, , , CONTINUOUS, Ze Burrell M.D.    Physical Examination:     Vitals:    04/11/20 0400 04/11/20 0500 04/11/20 0700 04/11/20 0800   BP:  118/73 106/56    Pulse: (!) 57 70 61    Resp: 15 (!) 21 20    Temp:       TempSrc:    Temporal   SpO2: 95% 97% 97%    Weight:       Height:           General: Patient is awake and in no acute distress  Eyes: examination of optic disks not indicated at this time  CV: RRR    NEUROLOGICAL EXAM:     Mental status: Awake, alert and fully oriented, follows commands  Speech and language: mildly dysarthric. The patient is able to name and repeat making a few paraphasic errors  Cranial nerve exam: Pupils are  equal, round and reactive to light bilaterally. Visual fields are full. Extraocular muscles are intact. Sensation in the face is intact to light touch. Face is symmetric. Hearing to finger rub equal. Palate elevates symmetrically. Shoulder shrug is full. Tongue is midline.  Motor exam: Strength is 5/5 in all extremities both distally and proximally. Tone is normal. No abnormal movements were seen on exam.  Sensory exam: No sensory deficits identified; does not have extinction to double simultaneous stimulation  Deep tendon reflexes:  2+ and symmetric. Toes up-going on the right and downgoing on the left  Coordination: no ataxia   Gait: deferred due to ICU status    Objective Data:    Labs:  No results found for: PROTHROMBTM, INR   Lab Results   Component Value Date/Time    WBC 6.8 04/11/2020 05:09 AM    RBC 5.24 04/11/2020 05:09 AM    HEMOGLOBIN 14.6 04/11/2020 05:09 AM    HEMATOCRIT 45.0 04/11/2020 05:09 AM    MCV 85.9 04/11/2020 05:09 AM    MCH 27.9 04/11/2020 05:09 AM    MCHC 32.4 (L) 04/11/2020 05:09 AM    MPV 9.7 04/11/2020 05:09 AM    NEUTSPOLYS 68.90 04/11/2020 05:09 AM    LYMPHOCYTES 21.50 (L) 04/11/2020 05:09 AM    MONOCYTES 8.40 04/11/2020 05:09 AM    EOSINOPHILS 0.00 04/11/2020 05:09 AM    BASOPHILS 0.60 04/11/2020 05:09 AM      Lab Results   Component Value Date/Time    SODIUM 137 04/11/2020 05:09 AM    POTASSIUM 3.8 04/11/2020 05:09 AM    CHLORIDE 105 04/11/2020 05:09 AM    CO2 22 04/11/2020 05:09 AM    GLUCOSE 91 04/11/2020 05:09 AM    BUN 14 04/11/2020 05:09 AM    CREATININE 0.64 04/11/2020 05:09 AM    CREATININE 1.1 12/09/2006 01:45 PM      Lab Results   Component Value Date/Time    CHOLSTRLTOT 123 04/10/2020 05:30 AM    LDL 71 04/10/2020 05:30 AM    HDL 41 04/10/2020 05:30 AM    TRIGLYCERIDE 55 04/10/2020 05:30 AM       No results found for: ALKPHOSPHAT, ASTSGOT, ALTSGPT, TBILIRUBIN     Imaging/Testing:    I interpreted and/or reviewed the patient's neuroimaging    CT-HEAD W/O   Final Result          Small hypoattenuation in the right frontal lobe adjacent to the sylvian fissure, in keeping with evolving infarct.      No acute intracranial hemorrhage. No mass effect.               MR-BRAIN-W/O   Final Result      1.  Small area of acute infarct in the right insular cortex and right frontal lobe. There is no hemorrhagic transformation.   2.  The right middle cerebral artery branches at the sylvian fissure are patent. However the right middle cerebral artery bifurcation cannot be accurately evaluated in this study.   3.  Mild cerebral volume loss.      EC-ECHOCARDIOGRAM COMPLETE W/O CONT   Final Result      CT-CTA HEAD WITH & W/O-POST PROCESS   Final Result      1.  RIGHT M2 occlusion   2.  No intracranial hemorrhage.      Discussed with Dr. Sullivan at 2148      CT-CTA NECK WITH & W/O-POST PROCESSING   Final Result      1.  Mixed density, irregular plaque in the proximal RIGHT internal carotid artery causing less than 50% stenosis   2.  Findings suggestive of ankylosing spondylitis      Discussed with Dr. Sullivan at 2148          Assessment and Plan:    Jose Simpson is a 64 y.o. man who was transferred from [facility] 4/9/20 as a drip and ship (tPA) for L sided weakness and facial droop. Patient was found to have a R MCA large vessel occlusion but was deemed not a candidate for IA intervention given improvement of examination with repeat NIHSS of 0.  Repeat vascular imaging showed YEN/RM2 thrombus vs. Ulcerated plaque for which vascular surgery has been consulted.  This is the likely etiology the the R M2 occlusion and is currently being managed with a heparin drip.  Brain MRI showing mild right sided infarct in the insula and corona radiata; pending R ICA CEA with Dr. Yeboah either tomorrow or Monday.     Plan:     - ACUTE TREATMENT WITH TPA as managed with pharmacy and coordinated care  - Admit to the intensive care unit  - continue heparin drip for suspected ulcerated plaque; pending  scheduleing with vascular surgery for R CEA 4/12 or 4/13/20  - Maintain blood pressure <180/105 per thrombolytic therapy guidelines  - Plavix has been added by vascular surgery for secondary stroke prevention  - continue high intensity statin per SPARCL Trial if and when safe to swallow  - evaluate and treat with PT/OT/ST  - stroke education  - referral has been placed for stroke bridge clinic    The evaluation of the patient, and recommended management, was discussed with the resident staff. I have performed a physical exam and reviewed and updated ROS and Plan today (4/11/2020). In review of yesterday's note (4/10/2020), there are no changes except as documented above.    Nma Bae MD  Director, Comprehensive Stroke Center, [facility]  Neurohospitalist, [facility]  Clinical  of Neurology, [employer]  t) [phone] (f) [phone]

## 2020-04-11 NOTE — THERAPY
OT consult received, pt pending right ICA CEA per neuro note. Will initiate therapy post procedure as appropriate.     Griselda Giraldo, MS OTR/L, pager # [phone]

## 2020-04-11 NOTE — DISCHARGE PLANNING
Scheduleing with vascular surgery for R CEA 4/12 or 4/13/20.  Would appreciate TX aplma s/p surgery.  TCC remains monitoring.

## 2020-04-11 NOTE — THERAPY
Speech Therapy Contact Note: Hold cog eval per Clinical Admissions Coordinator's request to complete therapy evals post surgery. RN aware.

## 2020-04-11 NOTE — THERAPY
PT eval order received. Pt pending surgical intervention with Dr. Yeboah. Will defer eval unil after this has been completed and appropriate for out of bed tasks.

## 2020-04-11 NOTE — CARE PLAN
Problem: Infection  Goal: Will remain free from infection  Outcome: PROGRESSING AS EXPECTED     Problem: Psychosocial Needs:  Goal: Level of anxiety will decrease  Outcome: PROGRESSING SLOWER THAN EXPECTED

## 2020-04-12 ENCOUNTER — ANESTHESIA (OUTPATIENT)
Dept: SURGERY | Facility: MEDICAL CENTER | Age: 65
DRG: 038 | End: 2020-04-12
Payer: COMMERCIAL

## 2020-04-12 ENCOUNTER — ANESTHESIA EVENT (OUTPATIENT)
Dept: SURGERY | Facility: MEDICAL CENTER | Age: 65
DRG: 038 | End: 2020-04-12
Payer: COMMERCIAL

## 2020-04-12 LAB
ANION GAP SERPL CALC-SCNC: 10 MMOL/L (ref 7–16)
APTT PPP: 43.3 SEC (ref 24.7–36)
BASOPHILS # BLD AUTO: 0.6 % (ref 0–1.8)
BASOPHILS # BLD: 0.04 K/UL (ref 0–0.12)
BUN SERPL-MCNC: 12 MG/DL (ref 8–22)
CALCIUM SERPL-MCNC: 8.9 MG/DL (ref 8.5–10.5)
CHLORIDE SERPL-SCNC: 107 MMOL/L (ref 96–112)
CO2 SERPL-SCNC: 22 MMOL/L (ref 20–33)
CREAT SERPL-MCNC: 0.7 MG/DL (ref 0.5–1.4)
EKG IMPRESSION: NORMAL
EOSINOPHIL # BLD AUTO: 0.01 K/UL (ref 0–0.51)
EOSINOPHIL NFR BLD: 0.2 % (ref 0–6.9)
ERYTHROCYTE [DISTWIDTH] IN BLOOD BY AUTOMATED COUNT: 44.2 FL (ref 35.9–50)
GLUCOSE SERPL-MCNC: 98 MG/DL (ref 65–99)
HCT VFR BLD AUTO: 43.9 % (ref 42–52)
HGB BLD-MCNC: 14 G/DL (ref 14–18)
IMM GRANULOCYTES # BLD AUTO: 0.04 K/UL (ref 0–0.11)
IMM GRANULOCYTES NFR BLD AUTO: 0.6 % (ref 0–0.9)
LYMPHOCYTES # BLD AUTO: 1.41 K/UL (ref 1–4.8)
LYMPHOCYTES NFR BLD: 22.8 % (ref 22–41)
MCH RBC QN AUTO: 28.1 PG (ref 27–33)
MCHC RBC AUTO-ENTMCNC: 31.9 G/DL (ref 33.7–35.3)
MCV RBC AUTO: 88 FL (ref 81.4–97.8)
MONOCYTES # BLD AUTO: 0.53 K/UL (ref 0–0.85)
MONOCYTES NFR BLD AUTO: 8.6 % (ref 0–13.4)
NEUTROPHILS # BLD AUTO: 4.16 K/UL (ref 1.82–7.42)
NEUTROPHILS NFR BLD: 67.2 % (ref 44–72)
NRBC # BLD AUTO: 0 K/UL
NRBC BLD-RTO: 0 /100 WBC
PLATELET # BLD AUTO: 184 K/UL (ref 164–446)
PMV BLD AUTO: 10 FL (ref 9–12.9)
POTASSIUM SERPL-SCNC: 4.2 MMOL/L (ref 3.6–5.5)
RBC # BLD AUTO: 4.99 M/UL (ref 4.7–6.1)
SODIUM SERPL-SCNC: 139 MMOL/L (ref 135–145)
WBC # BLD AUTO: 6.2 K/UL (ref 4.8–10.8)

## 2020-04-12 PROCEDURE — 700111 HCHG RX REV CODE 636 W/ 250 OVERRIDE (IP): Performed by: ANESTHESIOLOGY

## 2020-04-12 PROCEDURE — 95938 SOMATOSENSORY TESTING: CPT | Performed by: SURGERY

## 2020-04-12 PROCEDURE — 64999 UNLISTED PX NERVOUS SYSTEM: CPT | Performed by: SURGERY

## 2020-04-12 PROCEDURE — 700101 HCHG RX REV CODE 250: Performed by: ANESTHESIOLOGY

## 2020-04-12 PROCEDURE — 770022 HCHG ROOM/CARE - ICU (200)

## 2020-04-12 PROCEDURE — 03UK0KZ SUPPLEMENT RIGHT INTERNAL CAROTID ARTERY WITH NONAUTOLOGOUS TISSUE SUBSTITUTE, OPEN APPROACH: ICD-10-PCS | Performed by: SURGERY

## 2020-04-12 PROCEDURE — 80048 BASIC METABOLIC PNL TOTAL CA: CPT

## 2020-04-12 PROCEDURE — 160041 HCHG SURGERY MINUTES - EA ADDL 1 MIN LEVEL 4: Performed by: SURGERY

## 2020-04-12 PROCEDURE — 99233 SBSQ HOSP IP/OBS HIGH 50: CPT | Performed by: PSYCHIATRY & NEUROLOGY

## 2020-04-12 PROCEDURE — 95940 IONM IN OPERATNG ROOM 15 MIN: CPT | Performed by: SURGERY

## 2020-04-12 PROCEDURE — 700102 HCHG RX REV CODE 250 W/ 637 OVERRIDE(OP): Performed by: HOSPITALIST

## 2020-04-12 PROCEDURE — 160035 HCHG PACU - 1ST 60 MINS PHASE I: Performed by: SURGERY

## 2020-04-12 PROCEDURE — A9270 NON-COVERED ITEM OR SERVICE: HCPCS | Performed by: INTERNAL MEDICINE

## 2020-04-12 PROCEDURE — 500257: Performed by: SURGERY

## 2020-04-12 PROCEDURE — 85025 COMPLETE CBC W/AUTO DIFF WBC: CPT

## 2020-04-12 PROCEDURE — 03CK0ZZ EXTIRPATION OF MATTER FROM RIGHT INTERNAL CAROTID ARTERY, OPEN APPROACH: ICD-10-PCS | Performed by: SURGERY

## 2020-04-12 PROCEDURE — 700102 HCHG RX REV CODE 250 W/ 637 OVERRIDE(OP): Performed by: INTERNAL MEDICINE

## 2020-04-12 PROCEDURE — C1725 CATH, TRANSLUMIN NON-LASER: HCPCS | Performed by: SURGERY

## 2020-04-12 PROCEDURE — 500368 HCHG DRAIN, 7MM FLAT-FLUTED: Performed by: SURGERY

## 2020-04-12 PROCEDURE — 64450 NJX AA&/STRD OTHER PN/BRANCH: CPT | Performed by: SURGERY

## 2020-04-12 PROCEDURE — C1768 GRAFT, VASCULAR: HCPCS | Performed by: SURGERY

## 2020-04-12 PROCEDURE — 160009 HCHG ANES TIME/MIN: Performed by: SURGERY

## 2020-04-12 PROCEDURE — 93005 ELECTROCARDIOGRAM TRACING: CPT | Performed by: SURGERY

## 2020-04-12 PROCEDURE — 110454 HCHG SHELL REV 250: Performed by: SURGERY

## 2020-04-12 PROCEDURE — 500389 HCHG DRAIN, RESERVOIR SUCT JP 100CC: Performed by: SURGERY

## 2020-04-12 PROCEDURE — 4A10X4G MONITORING OF CENTRAL NERVOUS ELECTRICAL ACTIVITY, INTRAOPERATIVE, EXTERNAL APPROACH: ICD-10-PCS | Performed by: SURGERY

## 2020-04-12 PROCEDURE — 93010 ELECTROCARDIOGRAM REPORT: CPT | Performed by: INTERNAL MEDICINE

## 2020-04-12 PROCEDURE — 95955 EEG DURING SURGERY: CPT | Performed by: SURGERY

## 2020-04-12 PROCEDURE — 700105 HCHG RX REV CODE 258: Performed by: SURGERY

## 2020-04-12 PROCEDURE — 51798 US URINE CAPACITY MEASURE: CPT

## 2020-04-12 PROCEDURE — 85730 THROMBOPLASTIN TIME PARTIAL: CPT

## 2020-04-12 PROCEDURE — A9270 NON-COVERED ITEM OR SERVICE: HCPCS | Performed by: HOSPITALIST

## 2020-04-12 PROCEDURE — 700105 HCHG RX REV CODE 258: Performed by: INTERNAL MEDICINE

## 2020-04-12 PROCEDURE — 700101 HCHG RX REV CODE 250: Performed by: SURGERY

## 2020-04-12 PROCEDURE — 160036 HCHG PACU - EA ADDL 30 MINS PHASE I: Performed by: SURGERY

## 2020-04-12 PROCEDURE — A6402 STERILE GAUZE <= 16 SQ IN: HCPCS | Performed by: SURGERY

## 2020-04-12 PROCEDURE — 501445 HCHG STAPLER, SKIN DISP: Performed by: SURGERY

## 2020-04-12 PROCEDURE — 160029 HCHG SURGERY MINUTES - 1ST 30 MINS LEVEL 4: Performed by: SURGERY

## 2020-04-12 PROCEDURE — A9270 NON-COVERED ITEM OR SERVICE: HCPCS | Performed by: PSYCHIATRY & NEUROLOGY

## 2020-04-12 PROCEDURE — 501837 HCHG SUTURE CV: Performed by: SURGERY

## 2020-04-12 PROCEDURE — 700111 HCHG RX REV CODE 636 W/ 250 OVERRIDE (IP): Performed by: NURSE PRACTITIONER

## 2020-04-12 PROCEDURE — 160048 HCHG OR STATISTICAL LEVEL 1-5: Performed by: SURGERY

## 2020-04-12 PROCEDURE — 501838 HCHG SUTURE GENERAL: Performed by: SURGERY

## 2020-04-12 PROCEDURE — 700105 HCHG RX REV CODE 258: Performed by: ANESTHESIOLOGY

## 2020-04-12 PROCEDURE — 700102 HCHG RX REV CODE 250 W/ 637 OVERRIDE(OP): Performed by: PSYCHIATRY & NEUROLOGY

## 2020-04-12 PROCEDURE — 700111 HCHG RX REV CODE 636 W/ 250 OVERRIDE (IP): Performed by: SURGERY

## 2020-04-12 PROCEDURE — 160002 HCHG RECOVERY MINUTES (STAT): Performed by: SURGERY

## 2020-04-12 PROCEDURE — 3E0T3BZ INTRODUCTION OF ANESTHETIC AGENT INTO PERIPHERAL NERVES AND PLEXI, PERCUTANEOUS APPROACH: ICD-10-PCS | Performed by: ANESTHESIOLOGY

## 2020-04-12 DEVICE — PATCH .8X8CM XENOSURE BIOLOGIC VASCULAR---ORDER IN MULTIPLES OF 5---: Type: IMPLANTABLE DEVICE | Site: CAROTID | Status: FUNCTIONAL

## 2020-04-12 RX ORDER — HALOPERIDOL 5 MG/ML
1 INJECTION INTRAMUSCULAR
Status: DISCONTINUED | OUTPATIENT
Start: 2020-04-12 | End: 2020-04-12 | Stop reason: HOSPADM

## 2020-04-12 RX ORDER — DEXAMETHASONE SODIUM PHOSPHATE 4 MG/ML
4 INJECTION, SOLUTION INTRA-ARTICULAR; INTRALESIONAL; INTRAMUSCULAR; INTRAVENOUS; SOFT TISSUE
Status: DISCONTINUED | OUTPATIENT
Start: 2020-04-12 | End: 2020-04-13

## 2020-04-12 RX ORDER — ONDANSETRON 2 MG/ML
INJECTION INTRAMUSCULAR; INTRAVENOUS PRN
Status: DISCONTINUED | OUTPATIENT
Start: 2020-04-12 | End: 2020-04-12 | Stop reason: SURG

## 2020-04-12 RX ORDER — ROCURONIUM BROMIDE 10 MG/ML
INJECTION, SOLUTION INTRAVENOUS PRN
Status: DISCONTINUED | OUTPATIENT
Start: 2020-04-12 | End: 2020-04-12 | Stop reason: SURG

## 2020-04-12 RX ORDER — CLONIDINE HYDROCHLORIDE 0.1 MG/1
0.1 TABLET ORAL
Status: DISCONTINUED | OUTPATIENT
Start: 2020-04-12 | End: 2020-04-14 | Stop reason: HOSPADM

## 2020-04-12 RX ORDER — PROTAMINE SULFATE 10 MG/ML
INJECTION, SOLUTION INTRAVENOUS PRN
Status: DISCONTINUED | OUTPATIENT
Start: 2020-04-12 | End: 2020-04-12 | Stop reason: SURG

## 2020-04-12 RX ORDER — HEPARIN SODIUM 1000 [USP'U]/ML
INJECTION, SOLUTION INTRAVENOUS; SUBCUTANEOUS PRN
Status: DISCONTINUED | OUTPATIENT
Start: 2020-04-12 | End: 2020-04-12 | Stop reason: SURG

## 2020-04-12 RX ORDER — MIDAZOLAM HYDROCHLORIDE 1 MG/ML
1 INJECTION INTRAMUSCULAR; INTRAVENOUS
Status: DISCONTINUED | OUTPATIENT
Start: 2020-04-12 | End: 2020-04-12 | Stop reason: HOSPADM

## 2020-04-12 RX ORDER — SCOLOPAMINE TRANSDERMAL SYSTEM 1 MG/1
1 PATCH, EXTENDED RELEASE TRANSDERMAL
Status: DISCONTINUED | OUTPATIENT
Start: 2020-04-12 | End: 2020-04-14 | Stop reason: HOSPADM

## 2020-04-12 RX ORDER — CEFAZOLIN SODIUM 1 G/3ML
INJECTION, POWDER, FOR SOLUTION INTRAMUSCULAR; INTRAVENOUS PRN
Status: DISCONTINUED | OUTPATIENT
Start: 2020-04-12 | End: 2020-04-12 | Stop reason: SURG

## 2020-04-12 RX ORDER — HYDRALAZINE HYDROCHLORIDE 20 MG/ML
10 INJECTION INTRAMUSCULAR; INTRAVENOUS EVERY 4 HOURS PRN
Status: DISCONTINUED | OUTPATIENT
Start: 2020-04-12 | End: 2020-04-14 | Stop reason: HOSPADM

## 2020-04-12 RX ORDER — ONDANSETRON 2 MG/ML
4 INJECTION INTRAMUSCULAR; INTRAVENOUS EVERY 4 HOURS PRN
Status: DISCONTINUED | OUTPATIENT
Start: 2020-04-12 | End: 2020-04-14 | Stop reason: HOSPADM

## 2020-04-12 RX ORDER — BUPIVACAINE HYDROCHLORIDE AND EPINEPHRINE 5; 5 MG/ML; UG/ML
INJECTION, SOLUTION EPIDURAL; INTRACAUDAL; PERINEURAL
Status: DISCONTINUED | OUTPATIENT
Start: 2020-04-12 | End: 2020-04-12 | Stop reason: HOSPADM

## 2020-04-12 RX ORDER — LABETALOL HYDROCHLORIDE 5 MG/ML
10 INJECTION, SOLUTION INTRAVENOUS EVERY 4 HOURS PRN
Status: DISCONTINUED | OUTPATIENT
Start: 2020-04-12 | End: 2020-04-14 | Stop reason: HOSPADM

## 2020-04-12 RX ORDER — BUPIVACAINE HYDROCHLORIDE AND EPINEPHRINE 2.5; 5 MG/ML; UG/ML
INJECTION, SOLUTION EPIDURAL; INFILTRATION; INTRACAUDAL; PERINEURAL PRN
Status: DISCONTINUED | OUTPATIENT
Start: 2020-04-12 | End: 2020-04-12 | Stop reason: SURG

## 2020-04-12 RX ORDER — LABETALOL HYDROCHLORIDE 5 MG/ML
10 INJECTION, SOLUTION INTRAVENOUS
Status: DISCONTINUED | OUTPATIENT
Start: 2020-04-12 | End: 2020-04-14 | Stop reason: HOSPADM

## 2020-04-12 RX ORDER — DEXAMETHASONE SODIUM PHOSPHATE 4 MG/ML
INJECTION, SOLUTION INTRA-ARTICULAR; INTRALESIONAL; INTRAMUSCULAR; INTRAVENOUS; SOFT TISSUE PRN
Status: DISCONTINUED | OUTPATIENT
Start: 2020-04-12 | End: 2020-04-12 | Stop reason: SURG

## 2020-04-12 RX ORDER — HALOPERIDOL 5 MG/ML
1 INJECTION INTRAMUSCULAR EVERY 6 HOURS PRN
Status: DISCONTINUED | OUTPATIENT
Start: 2020-04-12 | End: 2020-04-13

## 2020-04-12 RX ORDER — MORPHINE SULFATE 4 MG/ML
1-5 INJECTION, SOLUTION INTRAMUSCULAR; INTRAVENOUS
Status: DISCONTINUED | OUTPATIENT
Start: 2020-04-12 | End: 2020-04-14

## 2020-04-12 RX ORDER — OXYCODONE HYDROCHLORIDE 5 MG/1
5 TABLET ORAL
Status: DISCONTINUED | OUTPATIENT
Start: 2020-04-12 | End: 2020-04-14 | Stop reason: HOSPADM

## 2020-04-12 RX ORDER — SODIUM CHLORIDE, SODIUM LACTATE, POTASSIUM CHLORIDE, CALCIUM CHLORIDE 600; 310; 30; 20 MG/100ML; MG/100ML; MG/100ML; MG/100ML
INJECTION, SOLUTION INTRAVENOUS
Status: DISCONTINUED | OUTPATIENT
Start: 2020-04-12 | End: 2020-04-12 | Stop reason: SURG

## 2020-04-12 RX ORDER — DIPHENHYDRAMINE HYDROCHLORIDE 50 MG/ML
25 INJECTION INTRAMUSCULAR; INTRAVENOUS EVERY 6 HOURS PRN
Status: DISCONTINUED | OUTPATIENT
Start: 2020-04-12 | End: 2020-04-14 | Stop reason: HOSPADM

## 2020-04-12 RX ORDER — LIDOCAINE HYDROCHLORIDE 20 MG/ML
INJECTION, SOLUTION EPIDURAL; INFILTRATION; INTRACAUDAL; PERINEURAL PRN
Status: DISCONTINUED | OUTPATIENT
Start: 2020-04-12 | End: 2020-04-12 | Stop reason: SURG

## 2020-04-12 RX ORDER — CLONIDINE HYDROCHLORIDE 0.1 MG/1
0.2 TABLET ORAL
Status: DISCONTINUED | OUTPATIENT
Start: 2020-04-12 | End: 2020-04-14 | Stop reason: HOSPADM

## 2020-04-12 RX ORDER — SODIUM CHLORIDE, SODIUM LACTATE, POTASSIUM CHLORIDE, CALCIUM CHLORIDE 600; 310; 30; 20 MG/100ML; MG/100ML; MG/100ML; MG/100ML
INJECTION, SOLUTION INTRAVENOUS CONTINUOUS
Status: DISCONTINUED | OUTPATIENT
Start: 2020-04-12 | End: 2020-04-12 | Stop reason: HOSPADM

## 2020-04-12 RX ADMIN — ATORVASTATIN CALCIUM 40 MG: 40 TABLET, FILM COATED ORAL at 17:39

## 2020-04-12 RX ADMIN — ENOXAPARIN SODIUM 40 MG: 100 INJECTION SUBCUTANEOUS at 19:44

## 2020-04-12 RX ADMIN — BUPIVACAINE HYDROCHLORIDE AND EPINEPHRINE 10 ML: 2.5; 5 INJECTION, SOLUTION EPIDURAL; INFILTRATION; INTRACAUDAL; PERINEURAL at 10:27

## 2020-04-12 RX ADMIN — PROTAMINE SULFATE 40 MG: 10 INJECTION, SOLUTION INTRAVENOUS at 12:16

## 2020-04-12 RX ADMIN — ROCURONIUM BROMIDE 10 MG: 10 INJECTION, SOLUTION INTRAVENOUS at 12:10

## 2020-04-12 RX ADMIN — DEXAMETHASONE SODIUM PHOSPHATE 4 MG: 4 INJECTION, SOLUTION INTRA-ARTICULAR; INTRALESIONAL; INTRAMUSCULAR; INTRAVENOUS; SOFT TISSUE at 10:21

## 2020-04-12 RX ADMIN — ONDANSETRON 4 MG: 2 INJECTION INTRAMUSCULAR; INTRAVENOUS at 10:21

## 2020-04-12 RX ADMIN — SUGAMMADEX 200 MG: 100 INJECTION, SOLUTION INTRAVENOUS at 12:28

## 2020-04-12 RX ADMIN — SODIUM CHLORIDE: 9 INJECTION, SOLUTION INTRAVENOUS at 00:49

## 2020-04-12 RX ADMIN — PROPOFOL 160 MG: 10 INJECTION, EMULSION INTRAVENOUS at 10:21

## 2020-04-12 RX ADMIN — DIPHENHYDRAMINE HYDROCHLORIDE 25 MG: 25 TABLET ORAL at 01:23

## 2020-04-12 RX ADMIN — ROCURONIUM BROMIDE 50 MG: 10 INJECTION, SOLUTION INTRAVENOUS at 10:21

## 2020-04-12 RX ADMIN — SENNOSIDES AND DOCUSATE SODIUM 2 TABLET: 8.6; 5 TABLET ORAL at 17:39

## 2020-04-12 RX ADMIN — ROCURONIUM BROMIDE 10 MG: 10 INJECTION, SOLUTION INTRAVENOUS at 11:46

## 2020-04-12 RX ADMIN — ROCURONIUM BROMIDE 10 MG: 10 INJECTION, SOLUTION INTRAVENOUS at 10:59

## 2020-04-12 RX ADMIN — SODIUM CHLORIDE, POTASSIUM CHLORIDE, SODIUM LACTATE AND CALCIUM CHLORIDE: 600; 310; 30; 20 INJECTION, SOLUTION INTRAVENOUS at 10:16

## 2020-04-12 RX ADMIN — LIDOCAINE HYDROCHLORIDE 4 ML: 20 INJECTION, SOLUTION EPIDURAL; INFILTRATION; INTRACAUDAL at 10:21

## 2020-04-12 RX ADMIN — CEFAZOLIN 3 G: 330 INJECTION, POWDER, FOR SOLUTION INTRAMUSCULAR; INTRAVENOUS at 10:21

## 2020-04-12 RX ADMIN — HEPARIN SODIUM 9000 UNITS: 1000 INJECTION, SOLUTION INTRAVENOUS; SUBCUTANEOUS at 11:42

## 2020-04-12 ASSESSMENT — ENCOUNTER SYMPTOMS
NERVOUS/ANXIOUS: 1
MUSCULOSKELETAL NEGATIVE: 1
FEVER: 0
HEADACHES: 0
EYES NEGATIVE: 1
VOMITING: 0
HEARTBURN: 0
CHILLS: 0
WEAKNESS: 0
SORE THROAT: 0
ABDOMINAL PAIN: 0
SPEECH CHANGE: 0
SHORTNESS OF BREATH: 0
NAUSEA: 0
COUGH: 0

## 2020-04-12 ASSESSMENT — PAIN SCALES - GENERAL: PAIN_LEVEL: 0

## 2020-04-12 NOTE — ANESTHESIA POSTPROCEDURE EVALUATION
Patient: Jose Simpson    Procedure Summary     Date:  04/12/20 Room / Location:  [facility] 10 / SURGERY [facility]    Anesthesia Start:  1016 Anesthesia Stop:  1239    Procedure:  ENDARTERECTOMY, CAROTID SSEP (Right Neck) Diagnosis:  (ulcerated right ICA plaque, right M2 occlusion)    Surgeon:  Brady Yeboah M.D. Responsible Provider:  Javier Marcus M.D.    Anesthesia Type:  general, peripheral nerve block ASA Status:  4          Final Anesthesia Type: general, peripheral nerve block  Last vitals  BP   Blood Pressure : 133/64    Temp   36.4 °C (97.6 °F)    Pulse   Pulse: 70   Resp   16    SpO2   96 %      Anesthesia Post Evaluation    Patient location during evaluation: PACU  Patient participation: complete - patient participated  Level of consciousness: awake and alert  Pain score: 0    Airway patency: patent  Anesthetic complications: no  Cardiovascular status: hemodynamically stable  Respiratory status: acceptable  Hydration status: euvolemic    PONV: none           Nurse Pain Score: 0 (NPRS)

## 2020-04-12 NOTE — ANESTHESIA TIME REPORT
Anesthesia Start and Stop Event Times     Date Time Event    4/12/2020 0956 Ready for Procedure     1016 Anesthesia Start     1239 Anesthesia Stop        Responsible Staff  04/12/20    Name Role Begin End    Javier Marcus M.D. Anesth 1016 1239        Preop Diagnosis (Free Text):  Pre-op Diagnosis     ulcerated right ICA plaque, right M2 occlusion        Preop Diagnosis (Codes):    Post op Diagnosis  Carotid stenosis, right      Premium Reason  E. Weekend    Comments:

## 2020-04-12 NOTE — OP REPORT
Vascular Surgery Operative Note  --------------------------------------------    Date of Service: 4/12/2020    Patient Name:  Jose Simpson    Patient MRN:  [id number]    --------------------------------------------------------------------------------------------------    Preoperative Diagnosis:  1) Symptomatic ulcerate right carotid plaque    Postoperative Diagnosis:  1) Symptomatic ulcerate right carotid plaque    Procedure:  1) right carotid endarterectomy with neuromonitoring    -------------------------------------------------------------------------------------------------    Surgeon:   Brady Yeboah MD    Assistant:   Meg FINCH    Anesthesia:   GETA    EBL:    20 cc    Blood Products:  none    Heparin:   Systemically heparinized, 9000 units    Specimen:   None sent    Complications:  None     Disposition:   PACU in stable condition     Findings:   Small ulcerated plaque with adherent soft debris    -----------------------------------------------------------------------------------------------------    History:  Jsoe Simpson is a 64 y.o. male who presented with a right MCA stroke and improved with TPA. Workup revealed an ulcerated right carotid plaque and evidence of an embolism to the right MCA distribution.    I had a very detailed, thorough discussion with the patient regarding the severity of his carotid disease and that he is at high risk of stroke.  I explained the primary purpose of this operation is to prevent stroke from the plaque in the carotid artery.  I explained the details of the operation including neuro monitoring and possible use of a shunt.  I discussed the alternatives of optimal medical management or stenting, although carotid endarterectomy is preferable in the setting of such severe plaque and in someone who is acceptable risk for surgery.  I discussed the potential risks, including but not limited to bleeding, infection, injury to vessels or nerves, and risks of  anesthesia. I explained the risk that the operation itself can cause a stroke, although the risk of stroke from the operation is less than the risk of stroke if the plaque is not treated, and thus carotid endarterectomy is recommended.  All of their questions were answered. Patient understands and agrees to proceed.    Procedure Summary:  Following informed consent, patient was brought to the OR where general anesthesia was administered. Patient was positioned, neuromointoring was put in place and the right neck was prepped and draped in the usual sterile fashion.  Timeout was called to identify the correct patient, team and equipment.  Everyone was in agreement.  Procedure began with injection of local anesthesia along the right SCM and then a longitudinal incision was made and carried down through each layer using cautery to assure hemostasis.  The common facial vein was identified and ligated.  The common, external and internal carotid were identified and dissected circumferentially proximally and distally and encircled with loops.    Patient was systemically heparinized and then the artery was clamped, with the ICA being applied first.  The carotid was opened with a longitudinal arteriotomy and an ulcerated plaque with adherent soft material was seen.  There were no changes on EEG monitoring. Endarterectomy of the plaque was performed and the distal ICA endpoint was tacked with prolene sutures.  Once complete, a bovine pericardial patch was sutured with a running 5-0 prolene suture.  The artery was flushed and copiously irrigated, the suture line was completed.  The clamps were removed, with the ICA being removed last, and flow was restored.  There was a continuous flow signal in the ICA at this point.  The heparin was reversed with protamine. Topical hemostatic was applied.  A 10 flat AMBROSE drain was placed and secured with a nylon suture.     At this point we had good hemostasis.  The platysma was reapproximated  with interrupted vicryl sutures.  The skin was reapproximated with a running subcuticular suture.  A sterile dressing was placed. Patient was extubated and returned to PACU in stable condition.  All counts were correct at the conclusion of the case.         Brady Yeboah MD  General and Vascular Surgery  [facility]  Cell: [phone]

## 2020-04-12 NOTE — OR NURSING
BROOKE. DAVIDSON. AxOx4. Swallowed without difficulty. States not in pain, had a block in surgery.  equal and strong. When he smiles with teeth showing there is L facial droop, none present when teeth not showing. No deficit licking lips, or with moving tongue side to side. Eyebrow raise is equal. Dr Bradley stated he is satisfied with patients neuro status, the unequal smile may be behavioral. Endorsed to JAKE Garrison RN. Wife updated on status.

## 2020-04-12 NOTE — THERAPY
Speech Therapy Contact Note: Attempted to see pt for cog eval. Per RN, pt going for surgery at 10 AM, SLP to hold eval this date.

## 2020-04-12 NOTE — ANESTHESIA PREPROCEDURE EVALUATION
PONV    Relevant Problems   NEURO   (+) Cerebrovascular accident (CVA) due to occlusion of right middle cerebral artery (HCC)      CARDIAC   (+) Occlusion of right carotid artery      Other   (+) Ankylosing spondylitis (HCC)   (+) Class 3 severe obesity due to excess calories with serious comorbidity in adult (HCC)       Physical Exam    Airway   Mallampati: III  TM distance: >3 FB  Neck ROM: limited       Cardiovascular - normal exam  Rhythm: regular  Rate: normal  (-) murmur     Dental - normal exam         Pulmonary - normal exam  Breath sounds clear to auscultation     Abdominal    Neurological - normal exam               Anesthesia Plan    ASA 4   ASA physical status 4 criteria: CVA or TIA - recent (< 3 months)    Plan - general and peripheral nerve block     Peripheral nerve block will be post-op pain control  Airway plan will be ETT        Induction: intravenous      Pertinent diagnostic labs and testing reviewed    Informed Consent:    Anesthetic plan and risks discussed with patient.    Use of blood products discussed with: patient whom.

## 2020-04-12 NOTE — ANESTHESIA PROCEDURE NOTES
Airway  Date/Time: 4/12/2020 10:23 AM  Performed by: Javier Marcus M.D.  Authorized by: Javier Marcus M.D.     Location:  OR  Urgency:  Elective  Difficult Airway: Yes     Neck fused in flexion  Indications for Airway Management:  Anesthesia      Spontaneous Ventilation: absent    Sedation Level:  Deep  Preoxygenated: Yes    Patient Position:  Sniffing  Mask Difficulty Assessment:  0 - not attempted  Final Airway Type:  Endotracheal airway  Final Endotracheal Airway:  ETT  Cuffed: Yes    Technique Used for Successful ETT Placement:  Direct laryngoscopy  Insertion Site:  Oral  Blade Type:  Glide  Laryngoscope Blade/Videolaryngoscope Blade Size:  4  ETT Size (mm):  8.0  Measured from:  Teeth  ETT to Teeth (cm):  23  Placement Verified by: auscultation and capnometry    Cormack-Lehane Classification:  Grade IIb - view of arytenoids or posterior of glottis only  Number of Attempts at Approach:  1

## 2020-04-12 NOTE — PROGRESS NOTES
Vascular    Alert and conversant, no acute changes    Planning right carotid endarterectomy tomorrow at 10 AM    Brady Yeboah MD  [facility] (General and Vascular Surgery)  Cell: [phone] (text or call is fine, if you don't reach me please try my office)  Office: [phone]  __________________________________________________________________  Patient:Jose Deepak Simpson   MRN:[id number]   CSN:[id number]    4/11/2020    5:28 PM

## 2020-04-12 NOTE — PROGRESS NOTES
Spoke with Dr. Yeboah regarding heparin prior to surgery this morning, order to stop heparin gtt at 0400.

## 2020-04-12 NOTE — ANESTHESIA PROCEDURE NOTES
Arterial Line  Performed by: Javier Marcus M.D.  Authorized by: Javier Marcus M.D.     Start Time:  4/12/2020 10:32 AM  End Time:  4/12/2020 10:33 AM  Localization: surface landmarks    Patient Location:  OR  Indication: continuous blood pressure monitoring        Catheter Size:  20 G  Seldinger Technique?: Yes    Laterality:  Right  Site:  Radial artery  Line Secured:  Antimicrobial disc, tape and transparent dressing  Events: patient tolerated procedure well with no complications

## 2020-04-12 NOTE — PROGRESS NOTES
Critical Care Progress Note    Date of admission  4/9/2020    Chief Complaint  64 y.o. male admitted 4/9/2020 with AIS s/p tpa    Hospital Course    64 y.o. male who presented 4/9/2020 with diet-controlled borderline diabetes, ankylosing spondylitis, and obesity who was in his usual state of health this evening when approximately 1900 while making his dinner he developed sudden onset of left-sided weakness, facial droop and speech difficulties.  His wife called 911 and he was brought into the emergency department at [facility] where he underwent imaging.  He was given TPA and was found to have a right ICA and right M2 occlusion for which she was transferred to [facility] for neuro intervention.  While being transferred to [facility] his symptoms began to resolve and upon arrival to the ED here the decision was made not to intervene on the clot given improvement in symptoms and risk status post TPA.  Vascular surgery was consulted and he was started on a heparin drip and being admitted to the intensive care unit in critical condition.  I was consulted for his critical care management.  Patient denies any recent trauma, chiropractic manipulation, prior stroke, recent illness.  He is left hand dominant.         Interval Problem Update  Reviewed last 24 hour events:   - Plan for CEA this am   - Heparin gtt; R ICA ulcerated plaque   - Neuro: GCs 15; NIHSS 0   - HR: 60-70s   - SBP: 100-140s   - GI: regular diet; npo for surgery   - UOP: 3.6L   - Beebe: no   - Tm: 36.8   - Lines: PIVs   - PPx: GI not indicated, DVT heparin gtt   - statin; Plavix      Review of Systems  Review of Systems   Constitutional: Negative for chills and fever.   HENT: Negative for sore throat.    Eyes: Negative.    Respiratory: Negative for cough and shortness of breath.    Cardiovascular: Negative for chest pain.   Gastrointestinal: Negative for abdominal pain, heartburn, nausea and vomiting.   Genitourinary: Negative.    Musculoskeletal:  Negative.    Neurological: Negative for speech change, weakness and headaches.   Psychiatric/Behavioral: The patient is nervous/anxious.         Vital Signs for last 24 hours   Temp:  [36.3 °C (97.3 °F)-36.9 °C (98.4 °F)] 36.9 °C (98.4 °F)  Pulse:  [60-87] 60  Resp:  [16-38] 27  BP: (103-134)/(53-93) 127/60  SpO2:  [87 %-98 %] 97 %    Hemodynamic parameters for last 24 hours       Respiratory Information for the last 24 hours       Physical Exam   Physical Exam  Constitutional:       General: He is not in acute distress.     Appearance: He is obese.   HENT:      Head: Normocephalic and atraumatic.      Right Ear: External ear normal.      Left Ear: External ear normal.      Nose: Nose normal.      Mouth/Throat:      Mouth: Mucous membranes are moist.      Pharynx: Oropharynx is clear. No oropharyngeal exudate.   Eyes:      Extraocular Movements: Extraocular movements intact.      Pupils: Pupils are equal, round, and reactive to light.   Cardiovascular:      Rate and Rhythm: Normal rate and regular rhythm.      Pulses: Normal pulses.   Pulmonary:      Effort: Pulmonary effort is normal.   Abdominal:      Tenderness: There is no abdominal tenderness.   Musculoskeletal:         General: No swelling.   Skin:     General: Skin is warm and dry.   Neurological:      Mental Status: He is alert.      Comments: GCS 15, NIHSS 0. CN2-12 intact, albeit slight flattened nasolabial fold but activates well. No drift in all 4 extremities.   Psychiatric:         Mood and Affect: Mood normal.         Behavior: Behavior normal.         Medications  Current Facility-Administered Medications   Medication Dose Route Frequency Provider Last Rate Last Dose   • atorvastatin (LIPITOR) tablet 40 mg  40 mg Oral Q EVENING Librado Villar M.D.   40 mg at 04/11/20 1722   • [START ON 4/13/2020] Secukinumab (COSENTYX) 150 mg Injection - PATIENT SUPPLIED  150 mg Subcutaneous Q28 DAYS Librado Villar M.D.       • diphenhydrAMINE (BENADRYL)  tablet/capsule 25-50 mg  25-50 mg Oral HS PRN Jeremy M Gonda, M.D.   25 mg at 04/12/20 0123   • NS infusion   Intravenous Continuous Jeremy M Gonda, M.D. 83 mL/hr at 04/12/20 0049     • labetalol (NORMODYNE/TRANDATE) injection 10-20 mg  10-20 mg Intravenous Q4HRS PRN Librado Villar M.D.       • hydrALAZINE (APRESOLINE) injection 10-20 mg  10-20 mg Intravenous Q4HRS PRN Librado Villar M.D.       • clopidogrel (PLAVIX) tablet 75 mg  75 mg Oral DAILY Brady Yeboah M.D.   75 mg at 04/11/20 1723   • senna-docusate (PERICOLACE or SENOKOT S) 8.6-50 MG per tablet 2 Tab  2 Tab Oral BID Yamel Palma M.D.   Stopped at 04/12/20 0600    And   • polyethylene glycol/lytes (MIRALAX) PACKET 1 Packet  1 Packet Oral QDAY PRSHARON Palma M.D.        And   • magnesium hydroxide (MILK OF MAGNESIA) suspension 30 mL  30 mL Oral QDAY PRSHRAON Palma M.D.        And   • bisacodyl (DULCOLAX) suppository 10 mg  10 mg Rectal QDAY PRSHARON Palma M.D.       • ondansetron (ZOFRAN) syringe/vial injection 4 mg  4 mg Intravenous Q4HRS PRSHARON Palma M.D.       • ondansetron (ZOFRAN ODT) dispertab 4 mg  4 mg Oral Q4HRS PRSHARON Palma M.D.       • promethazine (PHENERGAN) tablet 12.5-25 mg  12.5-25 mg Oral Q4HRS PRSHARON Palma M.D.       • promethazine (PHENERGAN) suppository 12.5-25 mg  12.5-25 mg Rectal Q4HRS PRSHARON Palma M.D.       • prochlorperazine (COMPAZINE) injection 5-10 mg  5-10 mg Intravenous Q4HRS PRSHARON Palma M.D.       • heparin infusion 25,000 units in 500 mL 0.45% NACL   Intravenous Continuous Ze Burrell M.D.   Stopped at 04/12/20 0400       Fluids    Intake/Output Summary (Last 24 hours) at 4/12/2020 0744  Last data filed at 4/12/2020 0600  Gross per 24 hour   Intake 3930 ml   Output 3600 ml   Net 330 ml       Laboratory          Recent Labs     04/10/20  0530 04/11/20  0509 04/12/20  0443   SODIUM 138 137 139   POTASSIUM 3.7 3.8 4.2   CHLORIDE 103 105 107   CO2 25 22 22   BUN 14  14 12   CREATININE 0.68 0.64 0.70   CALCIUM 9.0 8.8 8.9     Recent Labs     04/10/20  0530 04/11/20  0509 04/12/20  0443   GLUCOSE 94 91 98     Recent Labs     04/10/20  0430 04/11/20  0509 04/12/20  0443   WBC 7.4 6.8 6.2   NEUTSPOLYS 73.10* 68.90 67.20   LYMPHOCYTES 18.10* 21.50* 22.80   MONOCYTES 7.90 8.40 8.60   EOSINOPHILS 0.00 0.00 0.20   BASOPHILS 0.50 0.60 0.60     Recent Labs     04/10/20  0430  04/11/20  0053 04/11/20  0509 04/11/20  0645 04/12/20  0443   RBC 5.05  --   --  5.24  --  4.99   HEMOGLOBIN 14.1  --   --  14.6  --  14.0   HEMATOCRIT 42.9  --   --  45.0  --  43.9   PLATELETCT 208  --   --  178  --  184   APTT 39.8*   < > 61.2*  --  71.7* 43.3*    < > = values in this interval not displayed.       Imaging  CT:    Reviewed  R distal ICA ulcerated plaque on CTA    Assessment/Plan  * Cerebrovascular accident (CVA) due to occlusion of right middle cerebral artery (HCC)- (present on admission)  Assessment & Plan  With a right MCA syndrome status post TPA 4/9 at 1900 NIHSS 0  Can aim for lower SBP now > 24h post tpa  Heparin gtt  Plavix  Atorvastatin  MRI brain - Small area of acute infarct in the right insular cortex and right frontal lobe  Echo - nl EF  PT/OT/SLP  Close neurologic monitoring    Occlusion of right carotid artery- (present on admission)  Assessment & Plan  S/p R CEA with Dr. Yeboah  Returns from the OR at 1500  Doing well, no pain Right sided drain, no hematoma; No changes in NIHSS  Resume Plavix per Vasc Surg  Monitor ICU overnight then likely tx to the floor in the am      Class 3 severe obesity due to excess calories with serious comorbidity in adult (HCC)- (present on admission)  Assessment & Plan  Dietary and behavioral modifications to encourage weight loss  RD consultation    Ankylosing spondylitis (HCC)- (present on admission)  Assessment & Plan  His wife will bring in his monthly injectable medication on the day that is due 4/13 for administration       VTE:  Contraindicated  SCDs  Ulcer: Not Indicated  Lines: None    I have performed a physical exam and reviewed and updated ROS and Plan today (4/12/2020). In review of yesterday's note (4/11/2020), there are no changes except as documented above.     Discussed patient condition and risk of morbidity and/or mortality with RN, RT, Pharmacy, Code status disscussed, Charge nurse / hot rounds, Patient and neurology and vascular surgery

## 2020-04-12 NOTE — ANESTHESIA PROCEDURE NOTES
Peripheral Block  Date/Time: 4/12/2020 10:27 AM  Performed by: Javier Marcus M.D.  Authorized by: Javier Marcus M.D.     Start Time:  4/12/2020 10:27 AM  Reason for Block: at surgeon's request and post-op pain management    patient identified, IV checked, site marked, risks and benefits discussed, surgical consent, monitors and equipment checked, pre-op evaluation and timeout performed    Patient Position:  Supine  Prep: ChloraPrep    Monitoring:  Heart rate, continuous pulse ox and cardiac monitor  Block Region:  Head/Neck  Head/Neck - Block Type: Superficial cervical plexus block - OTHER peripheral nerve or branch    Laterality:  Right  Procedures: ultrasound guided  Image captured, interpreted and electronically stored.  Strength:  1 %  Dose:  3 ml  Block Type:  Single-shot  Needle Length:  25mm  Needle Gauge:  24 G  Needle Localization:  Anatomical landmarks  Injection Assessment:  Negative aspiration for heme and incremental injection

## 2020-04-12 NOTE — PROGRESS NOTES
Neurology Progress Note  Neurohospitalist Service, [facility]    Referring Physician: Librado Villar M.D.    Chief Complaint   Patient presents with   • Possible Stroke     Transfer from [facility], TPA infusion completed at time of arrival, transfer for Interventional Radiology       HPI: Refer to initial documented Neurology H&P, as detailed in the patient's chart.    Interval History 4/12/20: No acute events overnight.  Heparin gtt off since 4 AM in preparation for R ICA CEA with Dr. Yeboah ~10am.  Patient without complaints.     Review of systems: In addition to what is detailed in the HPI and/or updated in the interval history, all other systems reviewed and are negative.    Past Medical History:    has no past medical history on file.    FHx:  family history is not on file.    SHx:   reports that he has never smoked. He has never used smokeless tobacco. He reports that he does not drink alcohol or use drugs.    Medications:    Current Facility-Administered Medications:   •  atorvastatin (LIPITOR) tablet 40 mg, 40 mg, Oral, Q EVENING, Librado Villar M.D., 40 mg at 04/11/20 1722  •  [START ON 4/13/2020] Secukinumab (COSENTYX) 150 mg Injection - PATIENT SUPPLIED, 150 mg, Subcutaneous, Q28 DAYS, Librado Villar M.D.  •  diphenhydrAMINE (BENADRYL) tablet/capsule 25-50 mg, 25-50 mg, Oral, HS PRN, Jeremy M Gonda, M.D., 25 mg at 04/12/20 0123  •  NS infusion, , Intravenous, Continuous, Jeremy M Gonda, M.D., Last Rate: 83 mL/hr at 04/12/20 0049  •  labetalol (NORMODYNE/TRANDATE) injection 10-20 mg, 10-20 mg, Intravenous, Q4HRS PRN, Librado Villar M.D.  •  hydrALAZINE (APRESOLINE) injection 10-20 mg, 10-20 mg, Intravenous, Q4HRS PRN, Librado Villar M.D.  •  clopidogrel (PLAVIX) tablet 75 mg, 75 mg, Oral, DAILY, Brady Yeboah M.D., 75 mg at 04/11/20 1723  •  senna-docusate (PERICOLACE or SENOKOT S) 8.6-50 MG per tablet 2 Tab, 2 Tab, Oral, BID, Stopped at 04/12/20 0600 **AND**  polyethylene glycol/lytes (MIRALAX) PACKET 1 Packet, 1 Packet, Oral, QDAY PRN **AND** magnesium hydroxide (MILK OF MAGNESIA) suspension 30 mL, 30 mL, Oral, QDAY PRN **AND** bisacodyl (DULCOLAX) suppository 10 mg, 10 mg, Rectal, QDAY PRN, Yamel Palma M.D.  •  ondansetron (ZOFRAN) syringe/vial injection 4 mg, 4 mg, Intravenous, Q4HRS PRNYamel M.D.  •  ondansetron (ZOFRAN ODT) dispertab 4 mg, 4 mg, Oral, Q4HRS PRNYamel M.D.  •  promethazine (PHENERGAN) tablet 12.5-25 mg, 12.5-25 mg, Oral, Q4HRS PRNYamel M.D.  •  promethazine (PHENERGAN) suppository 12.5-25 mg, 12.5-25 mg, Rectal, Q4HRS PRNYamel M.D.  •  prochlorperazine (COMPAZINE) injection 5-10 mg, 5-10 mg, Intravenous, Q4HRS PRN, Yamel Palma M.D.  •  heparin infusion 25,000 units in 500 mL 0.45% NACL, , Intravenous, Continuous, Stopped at 04/12/20 0400 **AND** Protocol 209 Heparin Post Fibrinolytic Discontinue Enoxaparin (Lovenox), Dabigatran (Pradaxa), Rivaroxaban (Xarelto), Apixaban (Eliquis), Edoxaban (Savaysa, Lixiana), and Fondaparinux (Arixtra) and Argatroban prior to heparin administration, , , CONTINUOUS **AND** APTT, , , Q6H(S) **AND** APTT, , , TOMORROW AM (0300) **AND** RN to place APTT Orders IF, , , CONTINUOUS **AND** Protocol 209 HEPARIN LOADING DOSE - 1000 UNITS/ML GOAL APPROXIMATELY 1000 UNITS/ML, , , CONTINUOUS **AND** Protocol 209 INITIAL HEPARIN INFUSION - 25,000 UNITS  ML OF 0.45% NaCl = 50 UNITS/ML GOAL APPROXIMATELY 12 UNITS/KG/HOUR, , , CONTINUOUS **AND** Protocol 209 HEPARIN ADJUSTMENT RELOAD OR HOLD / RATE CHANGE, , , CONTINUOUS, Ze Burrell M.D.    Physical Examination:     Vitals:    04/12/20 0505 04/12/20 0600 04/12/20 0700 04/12/20 0800   BP: 132/60 118/59 127/60    Pulse: 60 62 60    Resp: 20 20 (!) 27    Temp:    36.9 °C (98.4 °F)   TempSrc:    Temporal   SpO2: 97% 96% 97%    Weight:       Height:           General: Patient is awake and in no acute distress  Eyes:  examination of optic disks not indicated at this time  CV: RRR    NEUROLOGICAL EXAM:     Mental status: Awake, alert and fully oriented, follows commands  Speech and language: nondysarthric. The patient is able to name and repeat  Cranial nerve exam: Pupils are equal, round and reactive to light bilaterally. Visual fields are full. Extraocular muscles are intact. Sensation in the face is intact to light touch. Face is notable for left sided nasolabial fold flattening. Hearing to finger rub equal. Palate elevates symmetrically. Shoulder shrug is full. Tongue is midline.  Motor exam: Strength is 5/5 in all extremities both distally and proximally. Tone is normal. No abnormal movements were seen on exam.  Sensory exam: No sensory deficits identified; no extinction  Deep tendon reflexes:  2+ and symmetric. Toes up-going on the right and downgoing on the left  Coordination: no ataxia   Gait: deferred due to ICU status    Objective Data:    Labs:  No results found for: PROTHROMBTM, INR   Lab Results   Component Value Date/Time    WBC 6.2 04/12/2020 04:43 AM    RBC 4.99 04/12/2020 04:43 AM    HEMOGLOBIN 14.0 04/12/2020 04:43 AM    HEMATOCRIT 43.9 04/12/2020 04:43 AM    MCV 88.0 04/12/2020 04:43 AM    MCH 28.1 04/12/2020 04:43 AM    MCHC 31.9 (L) 04/12/2020 04:43 AM    MPV 10.0 04/12/2020 04:43 AM    NEUTSPOLYS 67.20 04/12/2020 04:43 AM    LYMPHOCYTES 22.80 04/12/2020 04:43 AM    MONOCYTES 8.60 04/12/2020 04:43 AM    EOSINOPHILS 0.20 04/12/2020 04:43 AM    BASOPHILS 0.60 04/12/2020 04:43 AM      Lab Results   Component Value Date/Time    SODIUM 139 04/12/2020 04:43 AM    POTASSIUM 4.2 04/12/2020 04:43 AM    CHLORIDE 107 04/12/2020 04:43 AM    CO2 22 04/12/2020 04:43 AM    GLUCOSE 98 04/12/2020 04:43 AM    BUN 12 04/12/2020 04:43 AM    CREATININE 0.70 04/12/2020 04:43 AM    CREATININE 1.1 12/09/2006 01:45 PM      Lab Results   Component Value Date/Time    CHOLSTRLTOT 123 04/10/2020 05:30 AM    LDL 71 04/10/2020 05:30 AM     HDL 41 04/10/2020 05:30 AM    TRIGLYCERIDE 55 04/10/2020 05:30 AM       No results found for: ALKPHOSPHAT, ASTSGOT, ALTSGPT, TBILIRUBIN     Imaging/Testing:    I interpreted and/or reviewed the patient's neuroimaging    CT-HEAD W/O   Final Result         Small hypoattenuation in the right frontal lobe adjacent to the sylvian fissure, in keeping with evolving infarct.      No acute intracranial hemorrhage. No mass effect.               MR-BRAIN-W/O   Final Result      1.  Small area of acute infarct in the right insular cortex and right frontal lobe. There is no hemorrhagic transformation.   2.  The right middle cerebral artery branches at the sylvian fissure are patent. However the right middle cerebral artery bifurcation cannot be accurately evaluated in this study.   3.  Mild cerebral volume loss.      EC-ECHOCARDIOGRAM COMPLETE W/O CONT   Final Result      CT-CTA HEAD WITH & W/O-POST PROCESS   Final Result      1.  RIGHT M2 occlusion   2.  No intracranial hemorrhage.      Discussed with Dr. Sullivan at 2148      CT-CTA NECK WITH & W/O-POST PROCESSING   Final Result      1.  Mixed density, irregular plaque in the proximal RIGHT internal carotid artery causing less than 50% stenosis   2.  Findings suggestive of ankylosing spondylitis      Discussed with Dr. Sullivan at 2148          Assessment and Plan:    Jose Simpson is a 64 y.o. man who was transferred from [facility] 4/9/20 as a drip and ship ([facility]) for L sided weakness and facial droop. Patient was found to have a R MCA large vessel occlusion but was deemed not a candidate for IA intervention given improvement of examination with repeat NIHSS of 0.  Repeat vascular imaging showed YEN/RM2 thrombus vs. Ulcerated plaque for which vascular surgery has been consulted.  This is the likely etiology the the R M2 occlusion and is currently being managed with a heparin drip.  Brain MRI showing mild right sided infarct in the insula and corona radiata;  pending R ICA CEA with Dr. Yeboah today at 10AM.     Plan:     - ACUTE TREATMENT WITH TPA as managed with pharmacy and coordinated care  - heparin drip for suspected ulcerated plaque; held as of 4AM 4/12/20 for R CEA 4/12 or 4/13/20  - Maintain blood pressure <180/105 per thrombolytic therapy guidelines  - Plavix has been added by vascular surgery for secondary stroke prevention  - continue high intensity statin per SPARCL Trial if and when safe to swallow  - evaluate and treat with PT/OT/ST  - stroke education  - referral has been placed for stroke bridge clinic    The evaluation of the patient, and recommended management, was discussed with the resident staff. I have performed a physical exam and reviewed and updated ROS and Plan today (4/12/2020). In review of yesterday's note (4/11/2020), there are no changes except as documented above.    Nam Bae MD  Director, Comprehensive Stroke Center, [facility]  Neurohospitalist, [facility]  Clinical  of Neurology, [employer]  t) [phone] (f) [phone]

## 2020-04-13 LAB
ANION GAP SERPL CALC-SCNC: 11 MMOL/L (ref 7–16)
BASOPHILS # BLD AUTO: 0.3 % (ref 0–1.8)
BASOPHILS # BLD: 0.03 K/UL (ref 0–0.12)
BUN SERPL-MCNC: 13 MG/DL (ref 8–22)
CALCIUM SERPL-MCNC: 8.8 MG/DL (ref 8.5–10.5)
CHLORIDE SERPL-SCNC: 105 MMOL/L (ref 96–112)
CO2 SERPL-SCNC: 23 MMOL/L (ref 20–33)
CREAT SERPL-MCNC: 0.55 MG/DL (ref 0.5–1.4)
EOSINOPHIL # BLD AUTO: 0 K/UL (ref 0–0.51)
EOSINOPHIL NFR BLD: 0 % (ref 0–6.9)
ERYTHROCYTE [DISTWIDTH] IN BLOOD BY AUTOMATED COUNT: 42.2 FL (ref 35.9–50)
GLUCOSE SERPL-MCNC: 113 MG/DL (ref 65–99)
HCT VFR BLD AUTO: 42.6 % (ref 42–52)
HGB BLD-MCNC: 13.5 G/DL (ref 14–18)
IMM GRANULOCYTES # BLD AUTO: 0.06 K/UL (ref 0–0.11)
IMM GRANULOCYTES NFR BLD AUTO: 0.6 % (ref 0–0.9)
LYMPHOCYTES # BLD AUTO: 1.02 K/UL (ref 1–4.8)
LYMPHOCYTES NFR BLD: 9.4 % (ref 22–41)
MCH RBC QN AUTO: 27.6 PG (ref 27–33)
MCHC RBC AUTO-ENTMCNC: 31.7 G/DL (ref 33.7–35.3)
MCV RBC AUTO: 87.1 FL (ref 81.4–97.8)
MONOCYTES # BLD AUTO: 0.93 K/UL (ref 0–0.85)
MONOCYTES NFR BLD AUTO: 8.6 % (ref 0–13.4)
NEUTROPHILS # BLD AUTO: 8.77 K/UL (ref 1.82–7.42)
NEUTROPHILS NFR BLD: 81.1 % (ref 44–72)
NRBC # BLD AUTO: 0 K/UL
NRBC BLD-RTO: 0 /100 WBC
PLATELET # BLD AUTO: 200 K/UL (ref 164–446)
PMV BLD AUTO: 10.2 FL (ref 9–12.9)
POTASSIUM SERPL-SCNC: 4 MMOL/L (ref 3.6–5.5)
RBC # BLD AUTO: 4.89 M/UL (ref 4.7–6.1)
SODIUM SERPL-SCNC: 139 MMOL/L (ref 135–145)
WBC # BLD AUTO: 10.8 K/UL (ref 4.8–10.8)

## 2020-04-13 PROCEDURE — 700102 HCHG RX REV CODE 250 W/ 637 OVERRIDE(OP): Performed by: PSYCHIATRY & NEUROLOGY

## 2020-04-13 PROCEDURE — 700102 HCHG RX REV CODE 250 W/ 637 OVERRIDE(OP): Performed by: NURSE PRACTITIONER

## 2020-04-13 PROCEDURE — 700111 HCHG RX REV CODE 636 W/ 250 OVERRIDE (IP): Performed by: NURSE PRACTITIONER

## 2020-04-13 PROCEDURE — 700102 HCHG RX REV CODE 250 W/ 637 OVERRIDE(OP): Performed by: HOSPITALIST

## 2020-04-13 PROCEDURE — A9270 NON-COVERED ITEM OR SERVICE: HCPCS | Performed by: HOSPITALIST

## 2020-04-13 PROCEDURE — A9270 NON-COVERED ITEM OR SERVICE: HCPCS | Performed by: SURGERY

## 2020-04-13 PROCEDURE — 99233 SBSQ HOSP IP/OBS HIGH 50: CPT | Performed by: INTERNAL MEDICINE

## 2020-04-13 PROCEDURE — 770001 HCHG ROOM/CARE - MED/SURG/GYN PRIV*

## 2020-04-13 PROCEDURE — 700105 HCHG RX REV CODE 258: Performed by: INTERNAL MEDICINE

## 2020-04-13 PROCEDURE — 700102 HCHG RX REV CODE 250 W/ 637 OVERRIDE(OP): Performed by: SURGERY

## 2020-04-13 PROCEDURE — 92523 SPEECH SOUND LANG COMPREHEN: CPT

## 2020-04-13 PROCEDURE — 85025 COMPLETE CBC W/AUTO DIFF WBC: CPT

## 2020-04-13 PROCEDURE — 99232 SBSQ HOSP IP/OBS MODERATE 35: CPT | Performed by: HOSPITALIST

## 2020-04-13 PROCEDURE — 700102 HCHG RX REV CODE 250 W/ 637 OVERRIDE(OP): Performed by: INTERNAL MEDICINE

## 2020-04-13 PROCEDURE — 80048 BASIC METABOLIC PNL TOTAL CA: CPT

## 2020-04-13 PROCEDURE — A9270 NON-COVERED ITEM OR SERVICE: HCPCS | Performed by: NURSE PRACTITIONER

## 2020-04-13 PROCEDURE — 99233 SBSQ HOSP IP/OBS HIGH 50: CPT | Performed by: PSYCHIATRY & NEUROLOGY

## 2020-04-13 PROCEDURE — 92526 ORAL FUNCTION THERAPY: CPT

## 2020-04-13 PROCEDURE — A9270 NON-COVERED ITEM OR SERVICE: HCPCS | Performed by: INTERNAL MEDICINE

## 2020-04-13 PROCEDURE — A9270 NON-COVERED ITEM OR SERVICE: HCPCS | Performed by: PSYCHIATRY & NEUROLOGY

## 2020-04-13 PROCEDURE — 700101 HCHG RX REV CODE 250: Performed by: INTERNAL MEDICINE

## 2020-04-13 RX ORDER — ASPIRIN 81 MG/1
81 TABLET, CHEWABLE ORAL DAILY
Status: DISCONTINUED | OUTPATIENT
Start: 2020-04-13 | End: 2020-04-14 | Stop reason: HOSPADM

## 2020-04-13 RX ADMIN — SENNOSIDES AND DOCUSATE SODIUM 2 TABLET: 8.6; 5 TABLET ORAL at 05:59

## 2020-04-13 RX ADMIN — SODIUM CHLORIDE: 9 INJECTION, SOLUTION INTRAVENOUS at 04:08

## 2020-04-13 RX ADMIN — ATORVASTATIN CALCIUM 40 MG: 40 TABLET, FILM COATED ORAL at 16:59

## 2020-04-13 RX ADMIN — CLOPIDOGREL BISULFATE 75 MG: 75 TABLET ORAL at 16:59

## 2020-04-13 RX ADMIN — SENNOSIDES AND DOCUSATE SODIUM 2 TABLET: 8.6; 5 TABLET ORAL at 16:59

## 2020-04-13 RX ADMIN — ASPIRIN 81 MG 81 MG: 81 TABLET ORAL at 14:18

## 2020-04-13 RX ADMIN — OXYCODONE HYDROCHLORIDE 5 MG: 5 TABLET ORAL at 22:49

## 2020-04-13 RX ADMIN — NOREPINEPHRINE BITARTRATE 2 MCG/MIN: 1 INJECTION INTRAVENOUS at 00:49

## 2020-04-13 RX ADMIN — ENOXAPARIN SODIUM 40 MG: 100 INJECTION SUBCUTANEOUS at 16:59

## 2020-04-13 RX ADMIN — DIPHENHYDRAMINE HYDROCHLORIDE 25 MG: 25 TABLET ORAL at 22:49

## 2020-04-13 ASSESSMENT — ENCOUNTER SYMPTOMS
DIARRHEA: 0
EYES NEGATIVE: 1
FOCAL WEAKNESS: 1
HEARTBURN: 0
DOUBLE VISION: 0
NAUSEA: 0
NERVOUS/ANXIOUS: 1
HEADACHES: 0
SPEECH CHANGE: 0
WEAKNESS: 0
SORE THROAT: 0
FEVER: 0
CHILLS: 0
SHORTNESS OF BREATH: 0
MUSCULOSKELETAL NEGATIVE: 1
LOSS OF CONSCIOUSNESS: 0
BLURRED VISION: 0
COUGH: 0
ABDOMINAL PAIN: 0
VOMITING: 0
PALPITATIONS: 0
BACK PAIN: 0
DIZZINESS: 0

## 2020-04-13 NOTE — THERAPY
"Speech Language Therapy Evaluation completed to address cognition  Functional Status:  Patient seen this date for cognitive-linguistic evaluation. Patient awake, alert, and oriented in all spheres. Patient pleasant and cooperative during evaluation. Patient was administered the Cognistat in combination with other standard and non-standard assessments. Patient did report that he has baseline cognitive deficits in the areas of math, medication management, and financial management, as his wife reportedly helps with those tasks. Patient reported he was diagnosed with \"a learning or processing disorder\" in school and that he was in special education classes. Patient presented with minimal deficits in attention, auditory math calculations, and repetition of sentences. Patient performed WNL across all other domains tested. At this time, patient's deficits appear to be baseline (pt also endorsed this). Patient does not appear to have any acute cognitive deficits and does not appear to require any further acute or post-acute SLP services for cognition. However, recommend continued assistance with higher-level cognitive tasks, such as finances and medication management. Please reconsult SLP if needed. RN aware.     Recommendations: At this time, patient's deficits appear to be baseline (pt also endorsed this). Patient does not appear to have any acute cognitive deficits and does not appear to require any further acute or post-acute SLP services for cognition. However, recommend continued assistance with higher-level cognitive tasks, such as finances and medication management. Please reconsult SLP if needed.  Plan of Care: Patient with no further skilled SLP needs in the acute care setting at this time  SLP evaluation completed.  Patient is currently not being actively followed for therapy services at this time, however may be seen if requested by attending provider for 1 more visit within 30 days to address any discharge " "needs or if the patient has a change in status.        See \"Rehab Therapy-Acute\" Patient Summary Report for complete documentation.   "

## 2020-04-13 NOTE — PROGRESS NOTES
"Critical Care Progress Note    Date of admission  4/9/2020    Chief Complaint  64 y.o. male admitted 4/9/2020 with AIS s/p tpa    Hospital Course    \"64 y.o. male who presented 4/9/2020 with diet-controlled borderline diabetes, ankylosing spondylitis, and obesity who was in his usual state of health this evening when approximately 1900 while making his dinner he developed sudden onset of left-sided weakness, facial droop and speech difficulties.  His wife called 911 and he was brought into the emergency department at [facility] where he underwent imaging.  He was given TPA and was found to have a right ICA and right M2 occlusion for which she was transferred to [facility] for neuro intervention.  While being transferred to [facility] his symptoms began to resolve and upon arrival to the ED here the decision was made not to intervene on the clot given improvement in symptoms and risk status post TPA.  Vascular surgery was consulted and he was started on a heparin drip and being admitted to the intensive care unit in critical condition.  I was consulted for his critical care management.  Patient denies any recent trauma, chiropractic manipulation, prior stroke, recent illness.  He is left hand dominant.\"  Per Dr. Gonda's initial consultation.         Interval Problem Update  Reviewed last 24 hour events:   - s/p right CEA yesterday without complications   - neuro check every 4 hours   - NIH at 1   - SB/SR 50-70s   - overnight SBP 70s and required levo at 2-3, now off and SBP in 90s   - adequate UOP with mehta   - bed rest   - afebrile   - NS at 83cc/hr   - no RT issues   - lovenox   - tolerating regular diet     Yesterday's Events:   - Plan for CEA this am   - Heparin gtt; R ICA ulcerated plaque   - Neuro: GCs 15; NIHSS 0   - HR: 60-70s   - SBP: 100-140s   - GI: regular diet; npo for surgery   - UOP: 3.6L   - Mehta: no   - Tm: 36.8   - Lines: PIVs   - PPx: GI not indicated, DVT heparin gtt   - statin; " Plavix      Review of Systems  Review of Systems   Constitutional: Negative for chills and fever.   HENT: Negative for sore throat.    Eyes: Negative.    Respiratory: Negative for cough and shortness of breath.    Cardiovascular: Negative for chest pain.   Gastrointestinal: Negative for abdominal pain, heartburn, nausea and vomiting.   Genitourinary: Negative.    Musculoskeletal: Negative.    Neurological: Negative for speech change, weakness and headaches.   Psychiatric/Behavioral: The patient is nervous/anxious.     no change to ROS    Vital Signs for last 24 hours   Temp:  [36.3 °C (97.3 °F)-37.4 °C (99.4 °F)] 36.7 °C (98.1 °F)  Pulse:  [53-79] 70  Resp:  [6-59] 22  BP: ()/(43-72) 103/53  SpO2:  [81 %-100 %] 98 %    Hemodynamic parameters for last 24 hours       Respiratory Information for the last 24 hours       Physical Exam   Physical Exam  Vitals signs and nursing note reviewed.   Constitutional:       General: He is not in acute distress.     Appearance: Normal appearance. He is obese. He is not ill-appearing or toxic-appearing.   HENT:      Head: Normocephalic and atraumatic.      Right Ear: External ear normal.      Left Ear: External ear normal.      Nose: Nose normal. No rhinorrhea.      Mouth/Throat:      Mouth: Mucous membranes are moist.      Pharynx: Oropharynx is clear.   Eyes:      General: No scleral icterus.     Extraocular Movements: Extraocular movements intact.      Conjunctiva/sclera: Conjunctivae normal.      Pupils: Pupils are equal, round, and reactive to light.   Neck:      Musculoskeletal: Normal range of motion and neck supple.   Cardiovascular:      Rate and Rhythm: Normal rate and regular rhythm.      Pulses: Normal pulses.      Heart sounds: Normal heart sounds. No murmur.   Pulmonary:      Effort: Pulmonary effort is normal. No respiratory distress.      Breath sounds: Normal breath sounds. No wheezing.   Chest:      Chest wall: No tenderness.   Abdominal:      General:  Abdomen is flat. Bowel sounds are normal. There is no distension.      Palpations: Abdomen is soft.      Tenderness: There is no abdominal tenderness. There is no guarding or rebound.   Musculoskeletal: Normal range of motion.      Right lower leg: No edema.      Left lower leg: No edema.   Lymphadenopathy:      Cervical: No cervical adenopathy.   Skin:     General: Skin is warm and dry.      Capillary Refill: Capillary refill takes less than 2 seconds.      Findings: No rash.   Neurological:      Mental Status: He is alert and oriented to person, place, and time.      Comments: GCS 15, NIHSS 0. CN2-12 intact, albeit slight flattened nasolabial fold but activates well. No drift in all 4 extremities. Clear speech   Psychiatric:         Mood and Affect: Mood normal.         Behavior: Behavior normal.         Thought Content: Thought content normal.         Medications  Current Facility-Administered Medications   Medication Dose Route Frequency Provider Last Rate Last Dose   • norepinephrine (LEVOPHED) 8 mg in  mL Infusion  0-30 mcg/min Intravenous Continuous Mode Lloyd M.D.   Stopped at 04/13/20 0410   • Pharmacy Consult Request ...Pain Management Review 1 Each  1 Each Other PHARMACY TO DOSE Meg Shafer A.P.N.       • ondansetron (ZOFRAN) syringe/vial injection 4 mg  4 mg Intravenous Q4HRS PRN SILVANA Peng.P.N.       • dexamethasone (DECADRON) injection 4 mg  4 mg Intravenous Once PRN Meg Shafer A.P.N.       • diphenhydrAMINE (BENADRYL) injection 25 mg  25 mg Intravenous Q6HRS PRN SILVANA Peng.P.N.       • haloperidol lactate (HALDOL) injection 1 mg  1 mg Intravenous Q6HRS PRN Meg Shafer A.P.N.       • scopolamine (TRANSDERM-SCOP) patch 1 Patch  1 Patch Transdermal Q72HRS PRN SILVANA Peng.P.N.       • oxyCODONE immediate-release (ROXICODONE) tablet 5 mg  5 mg Oral Q3HRS PRN SILVANA Peng.P.N.       • clevidipine (CLEVIPREX) IV emulsion  2 mg/hr Intravenous Continuous Meg Shafer  A.P.N.       • labetalol (NORMODYNE/TRANDATE) injection 10 mg  10 mg Intravenous Q10 MIN PRN EDWARDO PengP.N.       • enoxaparin (LOVENOX) inj 40 mg  40 mg Subcutaneous QDAY SILVANA Peng.P.N.   40 mg at 04/12/20 1944   • cloNIDine (CATAPRES) tablet 0.2 mg  0.2 mg Oral Once PRN EDWARDO PengP.N.        And   • cloNIDine (CATAPRES) tablet 0.1 mg  0.1 mg Oral Q HOUR PRN SILVANA Peng.P.N.       • labetalol (NORMODYNE/TRANDATE) injection 10 mg  10 mg Intravenous Q4HRS PRN SILVANA Peng.P.N.       • hydrALAZINE (APRESOLINE) injection 10 mg  10 mg Intravenous Q4HRS PRN SILVANA Peng.P.N.       • morphine (pf) 4 MG/ML injection 1-5 mg  1-5 mg Intravenous Q2HRS PRN SILVANA Peng.P.N.       • atorvastatin (LIPITOR) tablet 40 mg  40 mg Oral Q EVENING Librado Villar M.D.   40 mg at 04/12/20 1739   • Secukinumab (COSENTYX) 150 mg Injection - PATIENT SUPPLIED  150 mg Subcutaneous Q28 DAYS Librado Villar M.D.       • diphenhydrAMINE (BENADRYL) tablet/capsule 25-50 mg  25-50 mg Oral HS PRN Jeremy M Gonda, M.D.   25 mg at 04/12/20 0123   • NS infusion   Intravenous Continuous Jeremy M Gonda, M.D. 83 mL/hr at 04/13/20 0408     • clopidogrel (PLAVIX) tablet 75 mg  75 mg Oral DAILY Brady Yeboah M.D.   Stopped at 04/12/20 1800   • senna-docusate (PERICOLACE or SENOKOT S) 8.6-50 MG per tablet 2 Tab  2 Tab Oral BID Yamel Palma M.D.   2 Tab at 04/13/20 0559    And   • polyethylene glycol/lytes (MIRALAX) PACKET 1 Packet  1 Packet Oral QDAY PRN Yamel Palma M.D.        And   • magnesium hydroxide (MILK OF MAGNESIA) suspension 30 mL  30 mL Oral QDAY PRN Yamel Palma M.D.        And   • bisacodyl (DULCOLAX) suppository 10 mg  10 mg Rectal QDAY PRN Yamel Palma M.D.       • ondansetron (ZOFRAN ODT) dispertab 4 mg  4 mg Oral Q4HRS PRN Yamel Palma M.D.           Fluids    Intake/Output Summary (Last 24 hours) at 4/13/2020 0721  Last data filed at 4/13/2020 0600  Gross per 24 hour   Intake 3792.62 ml    Output 2592 ml   Net 1200.62 ml       Laboratory          Recent Labs     04/11/20  0509 04/12/20  0443 04/13/20  0405   SODIUM 137 139 139   POTASSIUM 3.8 4.2 4.0   CHLORIDE 105 107 105   CO2 22 22 23   BUN 14 12 13   CREATININE 0.64 0.70 0.55   CALCIUM 8.8 8.9 8.8     Recent Labs     04/11/20  0509 04/12/20  0443 04/13/20  0405   GLUCOSE 91 98 113*     Recent Labs     04/11/20  0509 04/12/20  0443 04/13/20  0405   WBC 6.8 6.2 10.8   NEUTSPOLYS 68.90 67.20 81.10*   LYMPHOCYTES 21.50* 22.80 9.40*   MONOCYTES 8.40 8.60 8.60   EOSINOPHILS 0.00 0.20 0.00   BASOPHILS 0.60 0.60 0.30     Recent Labs     04/11/20  0053 04/11/20  0509 04/11/20  0645 04/12/20  0443 04/13/20  0405   RBC  --  5.24  --  4.99 4.89   HEMOGLOBIN  --  14.6  --  14.0 13.5*   HEMATOCRIT  --  45.0  --  43.9 42.6   PLATELETCT  --  178  --  184 200   APTT 61.2*  --  71.7* 43.3*  --        Imaging  CT:    Reviewed  R distal ICA ulcerated plaque on CTA    Assessment/Plan  * Cerebrovascular accident (CVA) due to occlusion of right middle cerebral artery (HCC)- (present on admission)  Assessment & Plan  Aright MCA syndrome status post TPA 4/9 at 1900 NIHSS 0  S/p heparin  Plavix/ASA  Atorvastatin  MRI brain - Small area of acute infarct in the right insular cortex and right frontal lobe  Echo - nl EF  PT/OT/SLP  Ok to transfer to neuro floor    Occlusion of right carotid artery- (present on admission)  Assessment & Plan  S/p R CEA with Dr. Yeboah on 4/12  Doing well s/p surgery  Plavix/ASA      Class 3 severe obesity due to excess calories with serious comorbidity in adult ([facility])- (present on admission)  Assessment & Plan  Dietary and behavioral modifications to encourage weight loss.  RD consultation    Ankylosing spondylitis ([facility])- (present on admission)  Assessment & Plan  Wife to bring in his monthly injectable medication on the day that is due 4/13 for administration       VTE:  Lovenox  Ulcer: Not Indicated  Lines: None    I have performed a  physical exam and reviewed and updated ROS and Plan today (4/13/2020). In review of yesterday's note (4/12/2020), there are no changes except as documented above.     Discussed patient condition and risk of morbidity and/or mortality with Hospitalist, RN, RT, Pharmacy, Code status disscussed, Charge nurse / hot rounds, Patient and neurology and vascular surgery     Pt is doing well and stable from an ICU perspective.  Case discussed with IM team and Dr. Yeboah and care will be transferred to the IM team.  The critical care team will sign off at this time.  Please call for any questions or concerns

## 2020-04-13 NOTE — PROGRESS NOTES
Hospital Medicine Daily Progress Note    Date of Service  4/13/2020    Chief Complaint  64 y.o. male admitted 4/9/2020 with L facial droop    Hospital Course    PMHx diet controled DM, Ankylosing Spondylitis.  Presnted to [facility] after acutely dveloping L sided weakness and facial droop.  There had imaging showing a R ICA and MCA M2 segment occlusion.  Given tPA and sent to us for considerationg of IR intervention.  On arriaval here Sxs had markedly improved and so did not go to the IR suite.  [facility] Sr[facility] Dr Resendiz consulted and pt had a R CEA done on 4/12.      Interval Problem Update  Pt states he's doing well.  Feels his speech is normal for him: has had a mild speech imepediment his hole life.      Consultants/Specialty  Neurology  [facility]    Code Status  full    Disposition  home    Review of Systems  Review of Systems   Constitutional: Negative for chills and fever.   HENT: Negative for nosebleeds and sore throat.    Eyes: Negative for blurred vision and double vision.   Respiratory: Negative for cough and shortness of breath.    Cardiovascular: Negative for chest pain, palpitations and leg swelling.   Gastrointestinal: Negative for abdominal pain, diarrhea, nausea and vomiting.   Genitourinary: Negative for dysuria and urgency.   Musculoskeletal: Negative for back pain.   Skin: Negative for rash.   Neurological: Positive for focal weakness. Negative for dizziness, loss of consciousness and headaches.        Physical Exam  Temp:  [36.4 °C (97.6 °F)-37.4 °C (99.4 °F)] 36.7 °C (98 °F)  Pulse:  [53-79] 67  Resp:  [6-65] 40  BP: ()/(43-70) 96/53  SpO2:  [81 %-100 %] 94 %    Physical Exam  Vitals signs reviewed.   Constitutional:       General: He is not in acute distress.     Appearance: He is well-developed. He is not diaphoretic.   HENT:      Head: Normocephalic and atraumatic.   Eyes:      Conjunctiva/sclera: Conjunctivae normal.   Neck:      Vascular: No JVD.   Cardiovascular:      Rate and Rhythm:  Normal rate.      Heart sounds: No murmur. No gallop.    Pulmonary:      Effort: Pulmonary effort is normal. No respiratory distress.      Breath sounds: No stridor. No wheezing or rales.   Abdominal:      Palpations: Abdomen is soft.      Tenderness: There is no abdominal tenderness. There is no guarding or rebound.   Skin:     General: Skin is warm and dry.      Findings: No rash.   Neurological:      Mental Status: He is oriented to person, place, and time.      Comments: Mild L facial droop  Slightly delayed speech: pt states that is normal for him   Psychiatric:         Mood and Affect: Mood normal.         Thought Content: Thought content normal.         Fluids    Intake/Output Summary (Last 24 hours) at 4/13/2020 1401  Last data filed at 4/13/2020 1400  Gross per 24 hour   Intake 3171.62 ml   Output 3295 ml   Net -123.38 ml       Laboratory  Recent Labs     04/11/20  0509 04/12/20  0443 04/13/20  0405   WBC 6.8 6.2 10.8   RBC 5.24 4.99 4.89   HEMOGLOBIN 14.6 14.0 13.5*   HEMATOCRIT 45.0 43.9 42.6   MCV 85.9 88.0 87.1   MCH 27.9 28.1 27.6   MCHC 32.4* 31.9* 31.7*   RDW 42.6 44.2 42.2   PLATELETCT 178 184 200   MPV 9.7 10.0 10.2     Recent Labs     04/11/20  0509 04/12/20  0443 04/13/20  0405   SODIUM 137 139 139   POTASSIUM 3.8 4.2 4.0   CHLORIDE 105 107 105   CO2 22 22 23   GLUCOSE 91 98 113*   BUN 14 12 13   CREATININE 0.64 0.70 0.55   CALCIUM 8.8 8.9 8.8     Recent Labs     04/11/20  0053 04/11/20  0645 04/12/20  0443   APTT 61.2* 71.7* 43.3*               Imaging  CT-HEAD W/O   Final Result         Small hypoattenuation in the right frontal lobe adjacent to the sylvian fissure, in keeping with evolving infarct.      No acute intracranial hemorrhage. No mass effect.               MR-BRAIN-W/O   Final Result      1.  Small area of acute infarct in the right insular cortex and right frontal lobe. There is no hemorrhagic transformation.   2.  The right middle cerebral artery branches at the sylvian fissure are  patent. However the right middle cerebral artery bifurcation cannot be accurately evaluated in this study.   3.  Mild cerebral volume loss.      EC-ECHOCARDIOGRAM COMPLETE W/O CONT   Final Result      CT-CTA HEAD WITH & W/O-POST PROCESS   Final Result      1.  RIGHT M2 occlusion   2.  No intracranial hemorrhage.      Discussed with Dr. Sullivan at 2148      CT-CTA NECK WITH & W/O-POST PROCESSING   Final Result      1.  Mixed density, irregular plaque in the proximal RIGHT internal carotid artery causing less than 50% stenosis   2.  Findings suggestive of ankylosing spondylitis      Discussed with Dr. Sullivan at 2148           Assessment/Plan  * Cerebrovascular accident (CVA) due to occlusion of right middle cerebral artery (HCC)- (present on admission)  Assessment & Plan  S/p alteplase  RMCA M2 segement likely embolic from ipsilateral carotid artery occlusion  Dr. Burrell will consult for neurology  IR has evaluated, no clot retrieval indicated with clinical improvement on alteplase  S/p CEA 4/12  Statin  BP control  DC on DAPT    Occlusion of right carotid artery- (present on admission)  Assessment & Plan  S/p CEA 4/12 Dr Resendiz  DC on DAPT  F/u with him as outpt    Ankylosing spondylitis (HCC)- (present on admission)  Assessment & Plan  Resume home Cosentyx on DC       VTE prophylaxis: enoxaparin

## 2020-04-13 NOTE — THERAPY
Occupational Therapy Contact Note    OT order received and acknowledged, pt with active bedrest orders following CEA. Will hold until pt is cleared for OOB activity.     Ashely Nino, OTR/L  pager# [phone]

## 2020-04-13 NOTE — PROGRESS NOTES
Dr. Lloyd updated regarding decreased SBP. Orders received to start Norepinephrine to maintain MAP > 60. Will continue to monitor.

## 2020-04-13 NOTE — CARE PLAN
Problem: Communication  Goal: The ability to communicate needs accurately and effectively will improve  Outcome: PROGRESSING AS EXPECTED     Problem: Safety  Goal: Will remain free from injury  Outcome: PROGRESSING AS EXPECTED  Goal: Will remain free from falls  Outcome: PROGRESSING AS EXPECTED     Problem: Psychosocial Needs:  Goal: Level of anxiety will decrease  Outcome: PROGRESSING AS EXPECTED     Problem: Pain Management  Goal: Pain level will decrease to patient's comfort goal  Outcome: PROGRESSING AS EXPECTED

## 2020-04-13 NOTE — PROGRESS NOTES
Vascular    Doing well  Neuro intact  Drain removed    Diet as tolerated  Activity as tolerated  ASA 81mg daily indefinitely  Plavix 75mg daily for 1 month post op  Likely home tomorrow if still doing well  FU 2-4 weeks (I will arrange a telephone FU visit)      Brady Yeboah MD  [facility] (General and Vascular Surgery)  Cell: [phone] (text or call is fine, if you don't reach me please try my office)  Office: [phone]  __________________________________________________________________  Patient:Jose Simpson   MRN:[id number]   CSN:[id number]    4/13/2020    11:41 AM

## 2020-04-13 NOTE — DISCHARGE PLANNING
· RNCM received call from Lindsey Lion D/C Planner  · Ayad states that he is following Pt and is available to assist with discharge planning   · RNBRANDYN provided update   · Ayad's phone number is [phone]

## 2020-04-13 NOTE — PROGRESS NOTES
Neurology Progress Note  Neurohospitalist Service, [facility]    Referring Physician: Librado Villar M.D.    ID/CC: Mr. Simpson is a 64M with no known/significant PMHx who presented to [facility] on 4/9/2020 with left sided weakness and facial droop and was found to have right carotid and M2 occlusion, now s/p alteplase at [facility] as well as R CEA on 4/12.     Interval History 4/13/20:   - s/p R CEA on 4/12 by Dr. Yeboah as above, currently on active bedrest  - overnight experienced decreased SBP for which norepi was started to maintain MAP >60. Also on low vol IVF.   - currently on plavix, high intensity atorvastatin. Ppx lovenox started yesterday.  - SLT exam pending  - patient with no acute complaints this morning.   - demonstrates some expressive aphasia on exam this morning      Review of systems: In addition to what is detailed in the HPI and/or updated in the interval history, all other systems reviewed and are negative. No changes in ROS since 4/12.     Past Medical History:    has no past medical history on file.    FHx:  family history is not on file.    SHx:   reports that he has never smoked. He has never used smokeless tobacco. He reports that he does not drink alcohol or use drugs.    Medications:    Current Facility-Administered Medications:   •  norepinephrine (LEVOPHED) 8 mg in  mL Infusion, 0-30 mcg/min, Intravenous, Continuous, Mode Lloyd M.D., Stopped at 04/13/20 0410  •  Pharmacy Consult Request ...Pain Management Review 1 Each, 1 Each, Other, PHARMACY TO DOSE, SILVANA Peng.P.N.  •  ondansetron (ZOFRAN) syringe/vial injection 4 mg, 4 mg, Intravenous, Q4HRS PRN, SILVANA Peng.P.N.  •  dexamethasone (DECADRON) injection 4 mg, 4 mg, Intravenous, Once PRN, SILVANA Peng.P.N.  •  diphenhydrAMINE (BENADRYL) injection 25 mg, 25 mg, Intravenous, Q6HRS PRN, SILVANA Peng.P.N.  •  haloperidol lactate (HALDOL) injection 1 mg, 1 mg, Intravenous, Q6HRS PRN,  EDWARDO PengP.N.  •  scopolamine (TRANSDERM-SCOP) patch 1 Patch, 1 Patch, Transdermal, Q72HRS PRN, EDWARDO PengP.N.  •  oxyCODONE immediate-release (ROXICODONE) tablet 5 mg, 5 mg, Oral, Q3HRS PRN, EDWARDO PengP.N.  •  clevidipine (CLEVIPREX) IV emulsion, 2 mg/hr, Intravenous, Continuous, EDWARDO PengP.N.  •  labetalol (NORMODYNE/TRANDATE) injection 10 mg, 10 mg, Intravenous, Q10 MIN PRN, EDWARDO PengP.N.  •  enoxaparin (LOVENOX) inj 40 mg, 40 mg, Subcutaneous, QDAY, SILVANA Peng.P.N., 40 mg at 04/12/20 1944  •  cloNIDine (CATAPRES) tablet 0.2 mg, 0.2 mg, Oral, Once PRN **AND** cloNIDine (CATAPRES) tablet 0.1 mg, 0.1 mg, Oral, Q HOUR PRN, EDWARDO PengP.N.  •  labetalol (NORMODYNE/TRANDATE) injection 10 mg, 10 mg, Intravenous, Q4HRS PRN, EDWARDO PengP.N.  •  hydrALAZINE (APRESOLINE) injection 10 mg, 10 mg, Intravenous, Q4HRS PRN, EDWARDO PengP.N.  •  morphine (pf) 4 MG/ML injection 1-5 mg, 1-5 mg, Intravenous, Q2HRS PRN, EDWARDO PengP.N.  •  atorvastatin (LIPITOR) tablet 40 mg, 40 mg, Oral, Q EVENING, Librado Villar M.D., 40 mg at 04/12/20 1739  •  Secukinumab (COSENTYX) 150 mg Injection - PATIENT SUPPLIED, 150 mg, Subcutaneous, Q28 DAYS, Librado Villar M.D., 150 mg at 04/13/20 0900  •  diphenhydrAMINE (BENADRYL) tablet/capsule 25-50 mg, 25-50 mg, Oral, HS PRN, Jeremy M Gonda, M.D., 25 mg at 04/12/20 0123  •  NS infusion, , Intravenous, Continuous, Jeremy M Gonda, M.D., Last Rate: 83 mL/hr at 04/13/20 0408  •  clopidogrel (PLAVIX) tablet 75 mg, 75 mg, Oral, DAILY, Brady Yeboah M.D., Stopped at 04/12/20 1800  •  senna-docusate (PERICOLACE or SENOKOT S) 8.6-50 MG per tablet 2 Tab, 2 Tab, Oral, BID, 2 Tab at 04/13/20 0559 **AND** polyethylene glycol/lytes (MIRALAX) PACKET 1 Packet, 1 Packet, Oral, QDAY PRN **AND** magnesium hydroxide (MILK OF MAGNESIA) suspension 30 mL, 30 mL, Oral, QDAY PRN **AND** bisacodyl (DULCOLAX) suppository 10 mg, 10 mg, Rectal, QDAY PRN, [place]  "NISHA Palma  •  ondansetron (ZOFRAN ODT) dispertab 4 mg, 4 mg, Oral, Q4HRS PRN, Ymael Palma M.D.    Physical Examination:     Vitals:    04/13/20 0800 04/13/20 0830 04/13/20 0900 04/13/20 0930   BP:  (!) 98/64 (!) 94/50 103/55   Pulse: 73 70 64 69   Resp: (!) 43 (!) 41 17 20   Temp: 36.4 °C (97.6 °F)      TempSrc:       SpO2: 92% 97% 96% 97%   Weight:       Height:           General: Patient is awake and in no acute distress  Eyes: examination of optic disks not indicated at this time  CV: RRR    NEUROLOGICAL EXAM:     Mental status: Awake, alert and fully oriented, follows commands without difficulty  Speech and language: normal speech during interview except problems noted with repetition (\"no ifs ands or buts\"); possibly baseline as patient reported to have autism spectrum disorder  Cranial nerve exam: Extraocular movements are intact. Face is notable for left sided nasolabial fold flattening and left lower facial drooping.   Motor exam: Strength is 5/5 in all extremities both distally and proximally. Tone is normal. No abnormal movements were seen on exam.  Sensory exam: No sensory deficits identified  Coordination/gait: testing deferred as patient still on bedrest    Objective Data:    Labs:  No results found for: PROTHROMBTM, INR   Lab Results   Component Value Date/Time    WBC 10.8 04/13/2020 04:05 AM    RBC 4.89 04/13/2020 04:05 AM    HEMOGLOBIN 13.5 (L) 04/13/2020 04:05 AM    HEMATOCRIT 42.6 04/13/2020 04:05 AM    MCV 87.1 04/13/2020 04:05 AM    MCH 27.6 04/13/2020 04:05 AM    MCHC 31.7 (L) 04/13/2020 04:05 AM    MPV 10.2 04/13/2020 04:05 AM    NEUTSPOLYS 81.10 (H) 04/13/2020 04:05 AM    LYMPHOCYTES 9.40 (L) 04/13/2020 04:05 AM    MONOCYTES 8.60 04/13/2020 04:05 AM    EOSINOPHILS 0.00 04/13/2020 04:05 AM    BASOPHILS 0.30 04/13/2020 04:05 AM      Lab Results   Component Value Date/Time    SODIUM 139 04/13/2020 04:05 AM    POTASSIUM 4.0 04/13/2020 04:05 AM    CHLORIDE 105 04/13/2020 04:05 AM    CO2 23 " 04/13/2020 04:05 AM    GLUCOSE 113 (H) 04/13/2020 04:05 AM    BUN 13 04/13/2020 04:05 AM    CREATININE 0.55 04/13/2020 04:05 AM    CREATININE 1.1 12/09/2006 01:45 PM      Lab Results   Component Value Date/Time    CHOLSTRLTOT 123 04/10/2020 05:30 AM    LDL 71 04/10/2020 05:30 AM    HDL 41 04/10/2020 05:30 AM    TRIGLYCERIDE 55 04/10/2020 05:30 AM       No results found for: ALKPHOSPHAT, ASTSGOT, ALTSGPT, TBILIRUBIN     Imaging/Testing:    I interpreted and/or reviewed the patient's neuroimaging    CT-HEAD W/O   Final Result         Small hypoattenuation in the right frontal lobe adjacent to the sylvian fissure, in keeping with evolving infarct.      No acute intracranial hemorrhage. No mass effect.               MR-BRAIN-W/O   Final Result      1.  Small area of acute infarct in the right insular cortex and right frontal lobe. There is no hemorrhagic transformation.   2.  The right middle cerebral artery branches at the sylvian fissure are patent. However the right middle cerebral artery bifurcation cannot be accurately evaluated in this study.   3.  Mild cerebral volume loss.      EC-ECHOCARDIOGRAM COMPLETE W/O CONT   Final Result      CT-CTA HEAD WITH & W/O-POST PROCESS   Final Result      1.  RIGHT M2 occlusion   2.  No intracranial hemorrhage.      Discussed with Dr. Sullivan at 2148      CT-CTA NECK WITH & W/O-POST PROCESSING   Final Result      1.  Mixed density, irregular plaque in the proximal RIGHT internal carotid artery causing less than 50% stenosis   2.  Findings suggestive of ankylosing spondylitis      Discussed with Dr. Sullivan at 2148          Assessment and Plan:    Jose Simpson is a 64 y.o. man who was transferred from [facility] 4/9/20 s/p tPA for R MCA large vessel occlusion but was deemed not a candidate for IA intervention given improvement of examination with repeat NIHSS of 0.  Repeat vascular imaging showed YEN/RM2 thrombus vs. Ulcerated plaque for which vascular surgery was  consulted with subsequent R CEA on 4/12.  This is the likely etiology the the R M2 occlusion and was managed with heparin drip prior to definitive intervention yesterday.  Brain MRI showing mild right sided infarct in the insula and corona radiata.     Plan:     - Maintain blood pressure <180/105 per thrombolytic therapy guidelines; also maintain MAP >60 to ensure adequate perfusion  - cont Plavix as added by vascular surgery for secondary stroke prevention  - continue high intensity statin per SPARCL.   - f/u PT/OT/SLT recs; PT and SLT deferred yesterday as patient was headed to surgery  - stroke education  - referral placed yesterday by Dr. Bae for stroke bridge clinic    The evaluation of the patient, and recommended management, was discussed with the Dr. Owusu. I have performed a physical exam and reviewed and updated ROS and Plan today (4/13/2020). In review of yesterday's note (4/12/2020), there are no changes except as documented above.    Janie Murillo, PGY3 IM

## 2020-04-13 NOTE — THERAPY
"Speech Language Therapy dysphagia treatment completed.   Functional Status: Patient currently on a regular diet and per RN, patient appears to be tolerating diet without difficulty. Patient endorsed same. Patient presents with mild articulatory impreciseness, but with 100% intact intellgibility, and reports this is baseline. Patient consumed PO trials of mixed consistencies, crackers, and thin liquids via cup sip and straw. Patient consumed all PO trials independently and with no s/sx of aspiration. Vocal quality remained clear, no oral residue was appreciated, and patient independently utilized small bites/sips and slow feeding rate. At this time, recommend patient continue regular diet w/ thin liquids. Patient does not appear to require any further acute SLP services for dysphagia. RN aware. Please reconsult SLP if needed.     Recommendations:  At this time, recommend patient continue regular diet w/ thin liquids. Patient does not appear to require any further acute SLP services for dysphagia. Please reconsult SLP if needed.   Plan of Care: Patient with no further skilled SLP needs in the acute care setting at this time  Post-Acute Therapy: SLP treatment completed.  Patient is currently not being actively followed for therapy services at this time, however may be seen if requested by attending provider for 1 more visit within 30 days to address any discharge needs or if the patient has a change in status.      See \"Rehab Therapy-Acute\" Patient Summary Report for complete documentation.     "

## 2020-04-14 VITALS
HEIGHT: 67 IN | DIASTOLIC BLOOD PRESSURE: 67 MMHG | SYSTOLIC BLOOD PRESSURE: 118 MMHG | BODY MASS INDEX: 41.97 KG/M2 | OXYGEN SATURATION: 97 % | HEART RATE: 80 BPM | RESPIRATION RATE: 23 BRPM | WEIGHT: 267.42 LBS | TEMPERATURE: 99.4 F

## 2020-04-14 PROBLEM — I65.21 OCCLUSION OF RIGHT CAROTID ARTERY: Status: RESOLVED | Noted: 2020-04-10 | Resolved: 2020-04-14

## 2020-04-14 PROCEDURE — 700102 HCHG RX REV CODE 250 W/ 637 OVERRIDE(OP): Performed by: HOSPITALIST

## 2020-04-14 PROCEDURE — 99239 HOSP IP/OBS DSCHRG MGMT >30: CPT | Performed by: HOSPITALIST

## 2020-04-14 PROCEDURE — A9270 NON-COVERED ITEM OR SERVICE: HCPCS | Performed by: HOSPITALIST

## 2020-04-14 PROCEDURE — 99232 SBSQ HOSP IP/OBS MODERATE 35: CPT | Performed by: PSYCHIATRY & NEUROLOGY

## 2020-04-14 PROCEDURE — 97161 PT EVAL LOW COMPLEX 20 MIN: CPT

## 2020-04-14 PROCEDURE — 97166 OT EVAL MOD COMPLEX 45 MIN: CPT

## 2020-04-14 RX ORDER — CLOPIDOGREL BISULFATE 75 MG/1
75 TABLET ORAL DAILY
Qty: 30 TAB | Refills: 0 | Status: SHIPPED | OUTPATIENT
Start: 2020-04-14

## 2020-04-14 RX ORDER — ATORVASTATIN CALCIUM 40 MG/1
40 TABLET, FILM COATED ORAL EVERY EVENING
Qty: 30 TAB | Refills: 2 | Status: SHIPPED | OUTPATIENT
Start: 2020-04-14

## 2020-04-14 RX ORDER — ASPIRIN 81 MG/1
81 TABLET, CHEWABLE ORAL DAILY
Qty: 100 TAB | Refills: 2 | Status: SHIPPED | OUTPATIENT
Start: 2020-04-15

## 2020-04-14 RX ADMIN — ASPIRIN 81 MG 81 MG: 81 TABLET ORAL at 05:06

## 2020-04-14 RX ADMIN — SENNOSIDES AND DOCUSATE SODIUM 2 TABLET: 8.6; 5 TABLET ORAL at 05:05

## 2020-04-14 ASSESSMENT — COGNITIVE AND FUNCTIONAL STATUS - GENERAL
DAILY ACTIVITIY SCORE: 22
SUGGESTED CMS G CODE MODIFIER MOBILITY: CH
HELP NEEDED FOR BATHING: A LITTLE
MOBILITY SCORE: 24
DRESSING REGULAR LOWER BODY CLOTHING: A LITTLE
SUGGESTED CMS G CODE MODIFIER DAILY ACTIVITY: CJ

## 2020-04-14 ASSESSMENT — GAIT ASSESSMENTS
GAIT LEVEL OF ASSIST: SUPERVISED
DISTANCE (FEET): 400

## 2020-04-14 ASSESSMENT — ACTIVITIES OF DAILY LIVING (ADL): TOILETING: INDEPENDENT

## 2020-04-14 NOTE — DISCHARGE INSTRUCTIONS
Physician Discharge Instructions:  Discharge Diagnosis: Stroke and carotid occlusion  Proceed to discharge/transfer  to Home  Take Rx/Prescriptions as prescribed and reconciled per AVS  For OTC Medication consult with your Pharmacist first.  Diet: As tolerated  Activity: Low level as tolerated  F/u with PCP within 3-10 days at home location, ask for f/u prescriptions by PCP  F/u with Specialty MD: Vascular surgery as scheduled  For new, severe symptoms refer to the next Emergency Care or call your Physician.  Controlled Substance History was reviewed if new Rx was issued.    Tommy Worthington MD    Discharge Instructions  Procedure: carotid endarterectomy    1. ACTIVITIES: No strenuous activities or lifting greater than 20 pounds for 2 weeks after surgery.    2. DRIVING: You may drive whenever you are off pain medications and are able to perform the activities needed to drive, i.e. turning, bending, twisting, etc.     3. WOUND: It is not unusual for patients to experience swelling and even bruising, as well as a small amount of drainage from the incision. This is normal and will resolve over the next few days.     4. ICE: you may place ice on the wound to decrease the swelling for the first 24 hours and then discontinue. Apply ice for 20 minutes and then remove for 20 minutes, alternating while awake.    5. BATHING: If you have a white bandage, remove it 1 day after getting home. Once the bandage is removed it is OK to shower and get the incision can get wet directly.    6. BOWEL FUNCTION: After surgery, it is not uncommon for patients to experience constipation. This is due to decreasing activity levels as well as pain medications. You may wish to use a stool softener beginning immediately after surgery, and you may or may not need to use a laxative (Milk of Magnesia, Ex-lax; Senokot, etc.) as well.     7.CALL IF YOU HAVE: (1) Fevers to more than 101.0 F, (2) Unusual or excessive pain, (3) Drainage or fluid from  incision that may be foul smelling, increased tenderness or soreness at the wound or the wound edges are no longer together, redness or swelling at the incision site. Please do not hesitate to call with any other questions.     8. APPOINTMENT: You will have a telephone follow-up visit with your surgeon 2-4 weeks following your procedure. If you have any questions in the meantime please contact our office at [phone]    If you have any additional questions, please do not hesitate to call the office and speak to either myself or the physician on call.     Office address:   Brady Yeboah MD, [facility]  [address] [place] [address] [address], [place] [address]  [phone]    Discharge Instructions    Discharged to home by car with relative. Discharged via walking, hospital escort: Refused.  Special equipment needed: Not Applicable    Be sure to schedule a follow-up appointment with your primary care doctor or any specialists as instructed.     Discharge Plan:   Influenza Vaccine Indication: Not indicated: Previously immunized this influenza season and > 8 years of age    I understand that a diet low in cholesterol, fat, and sodium is recommended for good health. Unless I have been given specific instructions below for another diet, I accept this instruction as my diet prescription.   Other diet: Regular    Special Instructions:     Stroke/CVA/TIA/Hemorrhagic Ischemia Discharge Instructions  You have had a stroke. Your risk factors have been identified as follows:  Age - Over 55  Gender - Men are at a higher risk than women  Diabetes  Carotid Stenosis   High Cholesterol and lipids  Overweight  It is important that you reduce your risk factors to avoid another stroke in the future. Here are some general guidelines to follow:  · Eat healthy - avoid food high in fat.  · Get regular exercise.  · Maintain a healthy weight.  · Avoid smoking.  · Avoid alcohol and illegal drug use.  · Take your medications as directed.  For  more information regarding risk factors, refer to pages 17-19 in your Stroke Patient Education Guide. Stroke Education Guide was given to patient and significant other.    Warning signs of a stroke include (which can also be found on page 3 of your Stroke Patient Education Guide):  · Sudden numbness of weakness of the face, arm or leg (especially on one side of the body).  · Sudden confusion, trouble speaking or understanding.  · Sudden trouble seeing in one or both eyes.  · Sudden trouble walking, dizziness, loss of balance or coordination.  · Sudden severe headache with no known cause.  It is very important to get treatment quickly when a stroke occurs. If you experience any of the above warning signs, call 911 immediately.     Some patients who have had a stroke will be going home on a blood thinner medication called Warfarin (Coumadin).  This medication requires very close monitoring and follow up.  This follow up can be provided by either your Primary Care Physician or by [facility] Outpatient Anticoagulation Service.  The Outpatient Anticoagulation Service is located at the [facility] at [facility] ([facility]).  If you do not know when your follow up appointment is scheduled, call [phone] to verify your appointment time.      · Is patient discharged on Warfarin / Coumadin?   No     Depression / Suicide Risk    As you are discharged from this RUST, it is important to learn how to keep safe from harming yourself.    Recognize the warning signs:  · Abrupt changes in personality, positive or negative- including increase in energy   · Giving away possessions  · Change in eating patterns- significant weight changes-  positive or negative  · Change in sleeping patterns- unable to sleep or sleeping all the time   · Unwillingness or inability to communicate  · Depression  · Unusual sadness, discouragement and loneliness  · Talk of wanting to  die  · Neglect of personal appearance   · Rebelliousness- reckless behavior  · Withdrawal from people/activities they love  · Confusion- inability to concentrate     If you or a loved one observes any of these behaviors or has concerns about self-harm, here's what you can do:  · Talk about it- your feelings and reasons for harming yourself  · Remove any means that you might use to hurt yourself (examples: pills, rope, extension cords, firearm)  · Get professional help from the community (Mental Health, Substance Abuse, psychological counseling)  · Do not be alone:Call your Safe Contact- someone whom you trust who will be there for you.  · Call your local CRISIS HOTLINE 263-6083 or 451-076-2352  · Call your local Children's Mobile Crisis Response Team [place] (273) 315-9777 or www.[employer]  · Call the toll free National Suicide Prevention Hotlines   · National Suicide Prevention Lifeline 444-816-CEAU (0027)  · National Hope Line Network 800-SUICIDE (029-0444)

## 2020-04-14 NOTE — THERAPY
"Physical Therapy Evaluation completed.   Bed Mobility:  Supine to Sit: (up chair)  Transfers: Sit to Stand: Supervised  Gait: Level Of Assist: Supervised with No Equipment Needed       Plan of Care: Patient with no further skilled PT needs in the acute care setting at this time  Discharge Recommendations: Equipment: No Equipment Needed.     Pt presents s/p right carotid endarterectomy, now moves well. Pt is supervised for transfers, supervised for ambulation using no AD x 400+ feet, up/down 12 stairs with supervision. Pt lives with wife, reports that she can take time off work to assist as needed. No further inpt PT needs.  May need to call family to make sure wife can take time off to ensure smooth transition to home. Anticipate that the patient will have no further physical therapy needs after discharge from the hospital.    See \"Rehab Therapy-Acute\" Patient Summary Report for complete documentation.     "

## 2020-04-14 NOTE — THERAPY
"Occupational Therapy Evaluation completed.   Functional Status:  SPV ADL transfers, ModA LB dress, SPV UB dress, SPV standing grooming, SPV functional mobility of household distances without AD  Plan of Care: Will benefit from Occupational Therapy 1-2x more sessions if pt remains in house  Discharge Recommendations:  Equipment: No Equipment Needed. Post-acute therapy Anticipate that the patient will have no further occupational therapy needs after discharge from the hospital.     See \"Rehab Therapy-Acute\" Patient Summary Report for complete documentation.    Pt is 65yo male admitted with stroke-like symptoms, s/p tPA with resolution of symptoms, pt reports feeling \"back to normal\". Pt pmhx includes ankylosing spondylitis which impacts pt baseline posture, balance and flexibility, however pt demonstrated basic ADLs and ADL transfers at SPV level without AD or assist. Pt educated on AE to increase ease of self-care tasks including sock-aid and reacher for dressing, pt very interested. Should pt stay in-house, additional OT session with AE would be beneficial to reinforce edu with demonstration/practice. Pt reports supportive spouse able to assist as needed upon DC. Anticipate no additional acute OT needs upon DC.   "

## 2020-04-14 NOTE — PROGRESS NOTES
"Vascular    Doing well  Neuro intact  Did stairs with PT  He reports \"I feel like I have all of my mobility and strength back\"  Incision CDI can stay BARBARA unless there's any drainage then cover with dry gauze  OK to shower and get the incision wet      ASA 81mg daily indefinitely  Plavix 75mg daily for 1 month post op  Home anytime from my standpoint  Surgical discharge instructions put in the discharge section of Epic  FU 2-4 weeks (I will arrange a telephone FU visit)      Brady Yeboah MD  [facility] (General and Vascular Surgery)  Cell: [phone] (text or call is fine, if you don't reach me please try my office)  Office: [phone]  __________________________________________________________________  Patient:Jose Simpson   MRN:[id number]   CSN:[id number]    "

## 2020-04-14 NOTE — PROGRESS NOTES
Neurology Progress Note  Neurohospitalist Service, [facility]    Referring Physician: Librado Villar M.D.    ID/CC: Mr. Simpson is a 64M with no known/significant PMHx who presented to [facility] on 4/9/2020 with left sided weakness and facial droop and was found to have right carotid and M2 occlusion, now s/p alteplase at [facility] as well as R CEA on 4/12.     Interval History 4/13/20:   - no acute overnight events.  - mobilized OOB and up to chair this morning and ambulated with PT/OT today (notes/recs currently pending)  - per SLP yesterday, patient with no speech needs.   - currently on plavix + ASA + high intensity atorvastatin.   - Per pt, other than mild left-sided mouth droop, no other complaints. Denies other motor/sensory problems, speech/swallow issues, vision changes, or any other non-neurologic sx. He reported some mild pain at time of drain removal yesterday from CEA site but denies any pain at this time.     Review of systems: All systems reviewed and are negative except as noted above.    Past Medical History:    has no past medical history on file.    FHx:  family history is not on file.    SHx:   reports that he has never smoked. He has never used smokeless tobacco. He reports that he does not drink alcohol or use drugs.    Medications:    Current Facility-Administered Medications:   •  aspirin (ASA) chewable tab 81 mg, 81 mg, Oral, DAILY, Samm Helm D.O., 81 mg at 04/14/20 0506  •  Pharmacy Consult Request ...Pain Management Review 1 Each, 1 Each, Other, PHARMACY TO DOSE, Meg Shafer, A.P.N.  •  ondansetron (ZOFRAN) syringe/vial injection 4 mg, 4 mg, Intravenous, Q4HRS PRN, Meg Shafer A.P.N.  •  diphenhydrAMINE (BENADRYL) injection 25 mg, 25 mg, Intravenous, Q6HRS PRN, Meg Shafer, A.P.N.  •  scopolamine (TRANSDERM-SCOP) patch 1 Patch, 1 Patch, Transdermal, Q72HRS PRN, Meg Shafer A.P.N.  •  oxyCODONE immediate-release (ROXICODONE) tablet 5 mg, 5 mg,  Oral, Q3HRS PRN, Meg Shafer A.P.N., 5 mg at 04/13/20 2249  •  labetalol (NORMODYNE/TRANDATE) injection 10 mg, 10 mg, Intravenous, Q10 MIN PRN, Meg Shafer A.P.N.  •  enoxaparin (LOVENOX) inj 40 mg, 40 mg, Subcutaneous, QDAY, Meg Shafer A.P.N., 40 mg at 04/13/20 1659  •  cloNIDine (CATAPRES) tablet 0.2 mg, 0.2 mg, Oral, Once PRN **AND** cloNIDine (CATAPRES) tablet 0.1 mg, 0.1 mg, Oral, Q HOUR PRN, SILVANA Peng.P.N.  •  labetalol (NORMODYNE/TRANDATE) injection 10 mg, 10 mg, Intravenous, Q4HRS PRN, Meg Shafer A.P.N.  •  hydrALAZINE (APRESOLINE) injection 10 mg, 10 mg, Intravenous, Q4HRS PRN, Meg Shafer A.P.N.  •  morphine (pf) 4 MG/ML injection 1-5 mg, 1-5 mg, Intravenous, Q2HRS PRN, Meg Shafer A.P.N.  •  atorvastatin (LIPITOR) tablet 40 mg, 40 mg, Oral, Q EVENING, Librado Villar M.D., 40 mg at 04/13/20 1659  •  Secukinumab (COSENTYX) 150 mg Injection - PATIENT SUPPLIED, 150 mg, Subcutaneous, Q28 DAYS, Librado Villar M.D., 150 mg at 04/13/20 0900  •  diphenhydrAMINE (BENADRYL) tablet/capsule 25-50 mg, 25-50 mg, Oral, HS PRN, Jeremy M Gonda, M.D., 25 mg at 04/13/20 2249  •  clopidogrel (PLAVIX) tablet 75 mg, 75 mg, Oral, DAILY, Brady Yeboah M.D., 75 mg at 04/13/20 1659  •  senna-docusate (PERICOLACE or SENOKOT S) 8.6-50 MG per tablet 2 Tab, 2 Tab, Oral, BID, 2 Tab at 04/14/20 0505 **AND** polyethylene glycol/lytes (MIRALAX) PACKET 1 Packet, 1 Packet, Oral, QDAY PRN **AND** magnesium hydroxide (MILK OF MAGNESIA) suspension 30 mL, 30 mL, Oral, QDAY PRN **AND** bisacodyl (DULCOLAX) suppository 10 mg, 10 mg, Rectal, QDAY PRN, Yamel Palma M.D.  •  ondansetron (ZOFRAN ODT) dispertab 4 mg, 4 mg, Oral, Q4HRS PRN, Yamel Palma M.D.    Physical Examination:     Vitals:    04/14/20 0700 04/14/20 0800 04/14/20 0900 04/14/20 1000   BP:  109/56 111/87 118/67   Pulse: (!) 59 (!) 59 75 80   Resp: 17 18 (!) 24 (!) 23   Temp:  37.4 °C (99.4 °F)     TempSrc:  Temporal     SpO2: 95% 95% 97%    Weight:      "  Height:           General: Patient is awake and in no acute distress  Eyes: examination of optic disks not indicated at this time  CV: RRR    NEUROLOGICAL EXAM:     Mental status: Awake, alert and fully oriented, follows commands without difficulty  Speech and language: normal speech during interview except problems noted with repetition (\"no ifs ands or buts\") however on repeat exam today this was mildly present but improved compared to 4/13 exam  Cranial nerve exam: Pupils are equal in size. Extraocular movements are intact. Face is notable for left sided nasolabial fold flattening and left lower facial drooping, though this is mild. Tongue and uvula midline. SCM strength/shoulder shrug intact. Facial sensation intact in V1-V3.    Motor exam: Strength is 5/5 in all extremities both distally and proximally. Tone is normal. No abnormal movements were seen on exam.  Sensory exam: No sensory deficits identified to light touch sensation in UEs or LEs  Coordination/gait: deferred    Objective Data:    Labs:  No results found for: PROTHROMBTM, INR   Lab Results   Component Value Date/Time    WBC 10.8 04/13/2020 04:05 AM    RBC 4.89 04/13/2020 04:05 AM    HEMOGLOBIN 13.5 (L) 04/13/2020 04:05 AM    HEMATOCRIT 42.6 04/13/2020 04:05 AM    MCV 87.1 04/13/2020 04:05 AM    MCH 27.6 04/13/2020 04:05 AM    MCHC 31.7 (L) 04/13/2020 04:05 AM    MPV 10.2 04/13/2020 04:05 AM    NEUTSPOLYS 81.10 (H) 04/13/2020 04:05 AM    LYMPHOCYTES 9.40 (L) 04/13/2020 04:05 AM    MONOCYTES 8.60 04/13/2020 04:05 AM    EOSINOPHILS 0.00 04/13/2020 04:05 AM    BASOPHILS 0.30 04/13/2020 04:05 AM      Lab Results   Component Value Date/Time    SODIUM 139 04/13/2020 04:05 AM    POTASSIUM 4.0 04/13/2020 04:05 AM    CHLORIDE 105 04/13/2020 04:05 AM    CO2 23 04/13/2020 04:05 AM    GLUCOSE 113 (H) 04/13/2020 04:05 AM    BUN 13 04/13/2020 04:05 AM    CREATININE 0.55 04/13/2020 04:05 AM    CREATININE 1.1 12/09/2006 01:45 PM      Lab Results   Component Value " Date/Time    CHOLSTRLTOT 123 04/10/2020 05:30 AM    LDL 71 04/10/2020 05:30 AM    HDL 41 04/10/2020 05:30 AM    TRIGLYCERIDE 55 04/10/2020 05:30 AM       No results found for: ALKPHOSPHAT, ASTSGOT, ALTSGPT, TBILIRUBIN     Imaging/Testing:    I interpreted and/or reviewed the patient's neuroimaging    CT-HEAD W/O   Final Result         Small hypoattenuation in the right frontal lobe adjacent to the sylvian fissure, in keeping with evolving infarct.      No acute intracranial hemorrhage. No mass effect.               MR-BRAIN-W/O   Final Result      1.  Small area of acute infarct in the right insular cortex and right frontal lobe. There is no hemorrhagic transformation.   2.  The right middle cerebral artery branches at the sylvian fissure are patent. However the right middle cerebral artery bifurcation cannot be accurately evaluated in this study.   3.  Mild cerebral volume loss.      EC-ECHOCARDIOGRAM COMPLETE W/O CONT   Final Result      CT-CTA HEAD WITH & W/O-POST PROCESS   Final Result      1.  RIGHT M2 occlusion   2.  No intracranial hemorrhage.      Discussed with Dr. Sullivan at 2148      CT-CTA NECK WITH & W/O-POST PROCESSING   Final Result      1.  Mixed density, irregular plaque in the proximal RIGHT internal carotid artery causing less than 50% stenosis   2.  Findings suggestive of ankylosing spondylitis      Discussed with Dr. Sullivan at 2148          Assessment and Plan:    Jose Simpson is a 64 y.o. man who was transferred from [facility] 4/9/20 s/p tPA for R MCA large vessel occlusion but was deemed not a candidate for IA intervention given improvement of examination with repeat NIHSS of 0.  Repeat vascular imaging showed YEN/RM2 thrombus vs. Ulcerated plaque for which vascular surgery was consulted with subsequent R CEA on 4/12.  This is the likely etiology the the R M2 occlusion and was managed with heparin drip prior to definitive intervention on 4/13.  Brain MRI showing mild right  sided infarct in the insula and corona radiata.     Plan:     - Maintain blood pressure <180/105 per thrombolytic therapy guidelines; also maintain MAP >60 to ensure adequate perfusion (note: BPs well-controlled in 100s-110s SBP range)  - cont ASA + Plavix per vascular surgery with DAPt x 1 month then ongoing ASA  - continue high intensity statin  - f/u PT/OTrecs; per SLT, no speech needs  - stroke education  - referral placed 4/12 by Dr. Bae for stroke bridge clinic  - follow up with Neurology outpatient in 4-6 weeks    Neurology will now sign off. Thank you for consulting us in this patient's care. Please feel free to call for any further questions.     The evaluation of the patient, and recommended management, was discussed with the Dr. Owusu. I have performed a physical exam and reviewed and updated ROS and Plan today (4/14/2020). In review of yesterday's note (4/13/2020), there are no changes except as documented above.    Janie Murillo, PGY3 IM

## 2020-04-14 NOTE — CARE PLAN
Problem: Venous Thromboembolism (VTW)/Deep Vein Thrombosis (DVT) Prevention:  Goal: Patient will participate in Venous Thrombosis (VTE)/Deep Vein Thrombosis (DVT)Prevention Measures  Outcome: PROGRESSING AS EXPECTED  Pt. educated on importance of mobilization. Subcutaneous Lovenox shots in place. SCDs in place.     Problem: Knowledge Deficit  Goal: Knowledge of the prescribed therapeutic regimen will improve  Outcome: PROGRESSING AS EXPECTED  Pt. Educated about the plan of care and medication regimen. All questions and concerns addressed at this time.

## 2020-04-14 NOTE — CARE PLAN
Problem: Respiratory:  Goal: Respiratory status will improve  Note: Patient tolerated activity well on room air.  Patient has required 1-2 liters at night.       Problem: Mobility  Goal: Risk for activity intolerance will decrease  Note: Patient mobilized to the chair this morning.  Patient also ambulated around the unit with PT/OT this morning.

## 2020-04-15 NOTE — DISCHARGE SUMMARY
Discharge Summary    CHIEF COMPLAINT ON ADMISSION  Chief Complaint   Patient presents with   • Possible Stroke     Transfer from [facility], TPA infusion completed at time of arrival, transfer for Interventional Radiology       Reason for Admission  EMS     Admission Date  4/9/2020    CODE STATUS  Prior    HPI & HOSPITAL COURSE  This is a 64 y.o. male here with a recent history of acute left-sided weakness and facial droop, diagnosed at a different facility to have a right carotid clot and M2 clot, was given alteplase and transferred for interventional radiology intervention neurology consult and vascular consult.  The patient on presentation had almost complete resolution of his symptoms.  Patient was referred to vascular surgery for a right carotid endarterectomy.  The patient's MRI showed mild right-sided infarct in the insula and corona radiata.  The patient underwent a right carotid endarterectomy which he tolerated well.  The patient was started on appropriate medical therapy including aspirin, Plavix, atorvastatin.  Postoperatively the patient did well, was assessed by physical therapy, he had very little residual difficulties and at this time is medically stable for discharge.       Therefore, he is discharged in fair and stable condition to home with close outpatient follow-up.    The patient met 2-midnight criteria for an inpatient stay at the time of discharge.    Discharge Date  4/14/2020    FOLLOW UP ITEMS POST DISCHARGE  Follow-up with vascular surgery  Follow-up with the [facility] stroke clinic  Follow-up with the patient's primary care physician    DISCHARGE DIAGNOSES  Principal Problem:    Cerebrovascular accident (CVA) due to occlusion of right middle cerebral artery (HCC) POA: Yes  Active Problems:    Class 3 severe obesity due to excess calories with serious comorbidity in adult (HCC) POA: Yes    Ankylosing spondylitis (HCC) POA: Yes  Resolved Problems:    Occlusion of right carotid artery  POA: Yes  Status post right CEA    FOLLOW UP  No future appointments.  Brady Yeboah M.D.  [address]  [address]  [facility] [address] [phone]      We will schedule a telephone follow-up visit with you 2-4 weeks after surgery    Stroke Bridge Program   [phone]        [facility]  same phone number as Stroke Bridge Clinic          MEDICATIONS ON DISCHARGE     Medication List      START taking these medications      Instructions   aspirin 81 MG Chew chewable tablet  Start taking on:  April 15, 2020  Commonly known as:  ASA   Take 1 Tab by mouth every day.  Dose:  81 mg     atorvastatin 40 MG Tabs  Commonly known as:  LIPITOR   Take 1 Tab by mouth every evening.  Dose:  40 mg     clopidogrel 75 MG Tabs  Commonly known as:  PLAVIX   Take 1 Tab by mouth every day.  Dose:  75 mg        CONTINUE taking these medications      Instructions   Cosentyx Sensoready Pen 150 MG/ML Soaj  Generic drug:  Secukinumab   Inject 150 mg as instructed every 28 days.  Dose:  150 mg     Diclofenac Sodium 1 % Gel   Apply 2-4 g to affected area(s) 4 times a day as needed for Moderate Pain.  Dose:  2-4 g            Allergies  No Known Allergies    DIET  No orders of the defined types were placed in this encounter.      ACTIVITY  As tolerated.  Weight bearing as tolerated    CONSULTATIONS  Neurology  Vascular surgery  Interventional radiology    PROCEDURES  Right carotid endarterectomy    LABORATORY  Lab Results   Component Value Date    SODIUM 139 04/13/2020    POTASSIUM 4.0 04/13/2020    CHLORIDE 105 04/13/2020    CO2 23 04/13/2020    GLUCOSE 113 (H) 04/13/2020    BUN 13 04/13/2020    CREATININE 0.55 04/13/2020    CREATININE 1.1 12/09/2006        Lab Results   Component Value Date    WBC 10.8 04/13/2020    HEMOGLOBIN 13.5 (L) 04/13/2020    HEMATOCRIT 42.6 04/13/2020    PLATELETCT 200 04/13/2020        Total time of the discharge process exceeds 55 minutes.

## 2020-07-19 ENCOUNTER — HOSPITAL ENCOUNTER (EMERGENCY)
Dept: HOSPITAL 8 - ED | Age: 65
End: 2020-07-19
Payer: COMMERCIAL

## 2020-07-19 VITALS — BODY MASS INDEX: 40.96 KG/M2 | HEIGHT: 66 IN | WEIGHT: 254.85 LBS

## 2020-07-19 VITALS — DIASTOLIC BLOOD PRESSURE: 65 MMHG | SYSTOLIC BLOOD PRESSURE: 94 MMHG

## 2020-07-19 DIAGNOSIS — M47.814: ICD-10-CM

## 2020-07-19 DIAGNOSIS — M47.812: ICD-10-CM

## 2020-07-19 DIAGNOSIS — M54.12: Primary | ICD-10-CM

## 2020-07-19 DIAGNOSIS — M47.816: ICD-10-CM

## 2020-07-19 PROCEDURE — 72125 CT NECK SPINE W/O DYE: CPT

## 2020-07-19 PROCEDURE — 99284 EMERGENCY DEPT VISIT MOD MDM: CPT

## 2020-11-19 ENCOUNTER — HOSPITAL ENCOUNTER (OUTPATIENT)
Dept: HOSPITAL 8 - OUT | Age: 65
Discharge: HOME | End: 2020-11-19
Attending: INTERNAL MEDICINE
Payer: COMMERCIAL

## 2020-11-19 VITALS — DIASTOLIC BLOOD PRESSURE: 70 MMHG | SYSTOLIC BLOOD PRESSURE: 105 MMHG

## 2020-11-19 VITALS — WEIGHT: 257.06 LBS | BODY MASS INDEX: 40.35 KG/M2 | HEIGHT: 67 IN

## 2020-11-19 DIAGNOSIS — D12.0: ICD-10-CM

## 2020-11-19 DIAGNOSIS — Z20.828: ICD-10-CM

## 2020-11-19 DIAGNOSIS — Z80.0: ICD-10-CM

## 2020-11-19 DIAGNOSIS — Z86.010: ICD-10-CM

## 2020-11-19 DIAGNOSIS — Z86.73: ICD-10-CM

## 2020-11-19 DIAGNOSIS — Z90.49: ICD-10-CM

## 2020-11-19 DIAGNOSIS — Z98.890: ICD-10-CM

## 2020-11-19 DIAGNOSIS — Z12.11: Primary | ICD-10-CM

## 2020-11-19 DIAGNOSIS — E66.01: ICD-10-CM

## 2020-11-19 DIAGNOSIS — K64.8: ICD-10-CM

## 2020-11-19 DIAGNOSIS — K64.4: ICD-10-CM

## 2020-11-19 DIAGNOSIS — K57.30: ICD-10-CM

## 2020-11-19 DIAGNOSIS — Z93.3: ICD-10-CM

## 2020-11-19 DIAGNOSIS — M19.90: ICD-10-CM

## 2020-11-19 DIAGNOSIS — Z79.82: ICD-10-CM

## 2020-11-19 PROCEDURE — 45380 COLONOSCOPY AND BIOPSY: CPT

## 2020-11-19 PROCEDURE — 87635 SARS-COV-2 COVID-19 AMP PRB: CPT

## 2020-11-19 PROCEDURE — 88305 TISSUE EXAM BY PATHOLOGIST: CPT

## 2020-11-19 PROCEDURE — 93005 ELECTROCARDIOGRAM TRACING: CPT

## 2021-03-03 DIAGNOSIS — Z23 NEED FOR VACCINATION: ICD-10-CM

## 2021-05-18 ENCOUNTER — HOSPITAL ENCOUNTER (OUTPATIENT)
Dept: RADIOLOGY | Facility: MEDICAL CENTER | Age: 66
End: 2021-05-18
Attending: SURGERY
Payer: COMMERCIAL

## 2021-05-18 DIAGNOSIS — I65.23 BILATERAL CAROTID ARTERY STENOSIS: ICD-10-CM

## 2021-05-18 PROCEDURE — 93880 EXTRACRANIAL BILAT STUDY: CPT

## 2024-02-08 ENCOUNTER — OFFICE VISIT (OUTPATIENT)
Dept: VASCULAR SURGERY | Facility: MEDICAL CENTER | Age: 69
End: 2024-02-08
Payer: MEDICARE

## 2024-02-08 VITALS
DIASTOLIC BLOOD PRESSURE: 58 MMHG | HEART RATE: 68 BPM | HEIGHT: 67 IN | SYSTOLIC BLOOD PRESSURE: 118 MMHG | WEIGHT: 291 LBS | BODY MASS INDEX: 45.67 KG/M2 | TEMPERATURE: 97.5 F | OXYGEN SATURATION: 94 %

## 2024-02-08 DIAGNOSIS — I65.21 CAROTID STENOSIS, ASYMPTOMATIC, RIGHT: ICD-10-CM

## 2024-02-08 PROCEDURE — 99204 OFFICE O/P NEW MOD 45 MIN: CPT | Performed by: SURGERY

## 2024-02-08 PROCEDURE — 3078F DIAST BP <80 MM HG: CPT | Performed by: SURGERY

## 2024-02-08 PROCEDURE — 3074F SYST BP LT 130 MM HG: CPT | Performed by: SURGERY

## 2024-02-08 NOTE — H&P
Vascular Surgery            New Patient Consultation    Patient:Jose Simpson  MRN:[id number]  Primary care physician:Pcp Not In Computer  Referring Provider: unknown    Vascular Consultant: Brady Yeboah MD    Date: 2/8/2024  _____________________________________________________    Chief Complaint:     Carotid stenosis    History of Present Illness:   Jose Simpson  is a 68 y.o. year old male with a known history of carotid stenosis.  The patient underwent a right carotid endarterectomy on 4/12/2020 after having a right MCA stroke from his carotid disease.  Fortunately he has recovered relatively well after the stroke.  Patient is in surveillance for his carotid arteries.  However his previous duplex was over 2 years ago.  Fortunately the patient is not having any neurologic symptoms.    Past Medical History:   No past medical history on file.    Past Surgical History:     Past Surgical History:   Procedure Laterality Date    DE THROMBOENDARTECTMY NECK,NECK INCIS Right 4/12/2020    Procedure: ENDARTERECTOMY, CAROTID SSEP;  Surgeon: Brady Yeboah M.D.;  Location: SURGERY [facility];  Service: Vascular    OTHER ABDOMINAL SURGERY      Diverticulitus 1992/ Hernia Repair 2006     Allergies:   No Known Allergies  Medications:     Outpatient Encounter Medications as of 2/8/2024   Medication Sig Dispense Refill    aspirin (ASA) 81 MG Chew Tab chewable tablet Take 1 Tab by mouth every day. 100 Tab 2    atorvastatin (LIPITOR) 40 MG Tab Take 1 Tab by mouth every evening. 30 Tab 2    COSENTYX SENSOREADY  MG/ML Solution Auto-injector Inject 150 mg as instructed every 28 days.      clopidogrel (PLAVIX) 75 MG Tab Take 1 Tab by mouth every day. 30 Tab 0    [DISCONTINUED] Diclofenac Sodium 1 % Gel Apply 2-4 g to affected area(s) 4 times a day as needed for Moderate Pain.       No facility-administered encounter medications on file as of 2/8/2024.     Social History:     Social  History     Socioeconomic History    Marital status:      Spouse name: Not on file    Number of children: Not on file    Years of education: Not on file    Highest education level: Not on file   Occupational History    Not on file   Tobacco Use    Smoking status: Never    Smokeless tobacco: Never   Substance and Sexual Activity    Alcohol use: Never    Drug use: Never    Sexual activity: Not on file   Other Topics Concern    Not on file   Social History Narrative    Not on file     Social Determinants of Health     Financial Resource Strain: Not on file   Food Insecurity: Not on file   Transportation Needs: Not on file   Physical Activity: Not on file   Stress: Not on file   Social Connections: Not on file   Intimate Partner Violence: Not on file   Housing Stability: Not on file      Social History     Tobacco Use   Smoking Status Never   Smokeless Tobacco Never     Social History     Substance and Sexual Activity   Alcohol Use Never     Social History     Substance and Sexual Activity   Drug Use Never      Family History:   No family history on file.    Review of Systems:   Constitutional: Negative for fever or chills  HENT:    Negative for hearing loss or tinnitus    Eyes:    Negative for blurred vision or loss of vision  Respiratory:  Negative for cough or hemoptysis  Cardiac:  Negative for chest pain or palpitations  Vascular:  Negative for claudication, Negative for rest pain  Gastrointestinal: Negative for vomiting or abdominal pain     Negative for hematochezia or melena   Genitourinary: Negative for dysuria or hematuria   Musculoskeletal: Negative for myalgias or acute joint pain  Skin:   Negative for itching or rash  Neurological:  Negative for dizziness or headaches     Negative for speech disturbance     Negative for extremity weakness or paresthesias  Endo/Heme:  Negative for easy bruising or bleeding       Exam:   /58 (BP Location: Left arm, Patient Position: Sitting, BP Cuff Size: Adult  "long)   Pulse 68   Temp 36.4 °C (97.5 °F) (Temporal)   Ht 1.702 m (5' 7\")   Wt (!) 132 kg (291 lb)   SpO2 94%   BMI 45.58 kg/m²     Constitutional: Alert, oriented, no acute distress  HEENT:  Normocephalic and atraumatic, EOMI  Neck:   Supple, no JVD,   Cardiovascular: Regular rate and rhythm,   Pulmonary:  Good air entry bilaterally,    Abdominal:  Soft, non-tender, non-distended  Musculoskeletal: No tenderness, no deformity  Neurological:  CN II-XII grossly intact, no focal deficits  Skin:   Skin is warm and dry. No rash noted.  Vascular exam: Upper extremities warm and adequately perfused, no edema     Lower extremities warm and adequately perfused, no edema      Imaging:   No recent carotid duplex      Assessment and Plan:   -History of carotid stenosis    Recommendation for annual carotid surveillance with duplex.  The patient is overdue.  Fortunately has been doing well clinically with no specific concerns.  Will order updated carotid duplex and call him with the results.    _____________________________________________________  Brady Yeboah MD  [facility]  [phone]  [address], [place] [address]      "

## 2024-02-12 ENCOUNTER — HOSPITAL ENCOUNTER (OUTPATIENT)
Dept: RADIOLOGY | Facility: MEDICAL CENTER | Age: 69
End: 2024-02-12
Attending: SURGERY
Payer: MEDICARE

## 2024-02-12 DIAGNOSIS — I65.21 CAROTID STENOSIS, ASYMPTOMATIC, RIGHT: ICD-10-CM

## 2024-02-12 PROCEDURE — 93880 EXTRACRANIAL BILAT STUDY: CPT

## 2024-02-22 ENCOUNTER — OFFICE VISIT (OUTPATIENT)
Dept: VASCULAR SURGERY | Facility: MEDICAL CENTER | Age: 69
End: 2024-02-22
Payer: COMMERCIAL

## 2024-02-22 VITALS
DIASTOLIC BLOOD PRESSURE: 78 MMHG | SYSTOLIC BLOOD PRESSURE: 130 MMHG | HEIGHT: 67 IN | TEMPERATURE: 97.7 F | BODY MASS INDEX: 45.99 KG/M2 | WEIGHT: 293 LBS | OXYGEN SATURATION: 95 % | HEART RATE: 70 BPM

## 2024-02-22 DIAGNOSIS — I65.22 CAROTID STENOSIS, ASYMPTOMATIC, LEFT: ICD-10-CM

## 2024-02-22 PROCEDURE — 3078F DIAST BP <80 MM HG: CPT | Performed by: SURGERY

## 2024-02-22 PROCEDURE — 99212 OFFICE O/P EST SF 10 MIN: CPT | Performed by: SURGERY

## 2024-02-22 PROCEDURE — 3075F SYST BP GE 130 - 139MM HG: CPT | Performed by: SURGERY

## 2024-02-22 NOTE — PROGRESS NOTES
"                 VASCULAR SURGERY                 Clinic Progress Note  _________________________________    2/22/24 Doing well, surveillance imaging shows no significant carotid stenosis bilaterally    Vitals  /78 (BP Location: Left arm, Patient Position: Sitting, BP Cuff Size: Adult)   Pulse 70   Temp 36.5 °C (97.7 °F) (Temporal)   Ht 1.702 m (5' 7\")   Wt (!) 133 kg (293 lb)   SpO2 95%   BMI 45.89 kg/m²     Exam benign    A/P)  Doing well, no concerns at this time  Follow-up 1 year with surveillance carotid imaging or sooner if concerns arise.        Brady Yeboah MD  [facility]  [phone]  [address], [place] [address]  "

## 2025-01-17 ENCOUNTER — HOSPITAL ENCOUNTER (OUTPATIENT)
Dept: RADIOLOGY | Facility: MEDICAL CENTER | Age: 70
End: 2025-01-17
Attending: CLINICAL NURSE SPECIALIST
Payer: MEDICARE

## 2025-01-17 DIAGNOSIS — K21.9 GASTROESOPHAGEAL REFLUX DISEASE WITHOUT ESOPHAGITIS: ICD-10-CM

## 2025-01-17 PROCEDURE — 74240 X-RAY XM UPR GI TRC 1CNTRST: CPT

## 2025-02-03 ENCOUNTER — HOSPITAL ENCOUNTER (OUTPATIENT)
Dept: RADIOLOGY | Facility: MEDICAL CENTER | Age: 70
End: 2025-02-03
Attending: SURGERY
Payer: MEDICARE

## 2025-02-03 DIAGNOSIS — I65.22 CAROTID STENOSIS, ASYMPTOMATIC, LEFT: ICD-10-CM

## 2025-02-03 PROCEDURE — 93880 EXTRACRANIAL BILAT STUDY: CPT

## (undated) DEVICE — PACK MINOR BASIN - (2EA/CA)

## (undated) DEVICE — SET EXTENSION WITH 2 PORTS (48EA/CA) ***PART #2C8610 IS A SUBSTITUTE*****

## (undated) DEVICE — PROTECTOR ULNA NERVE - (36PR/CA)

## (undated) DEVICE — INTRAOP NEURO IN OR 1:1 PER 15 MIN

## (undated) DEVICE — SUTURE 6-0 PROLENE BV-1 D/A 30 (36PK/BX)"

## (undated) DEVICE — CHLORAPREP 26 ML APPLICATOR - ORANGE TINT(25/CA)

## (undated) DEVICE — WIPE INSTRUMENT MICROWIPE (20EA/SP)

## (undated) DEVICE — GLOVE BIOGEL SZ 8 SURGICAL PF LTX - (50PR/BX 4BX/CA)

## (undated) DEVICE — KIT RADIAL ARTERY 20GA W/MAX BARRIER AND BIOPATCH  (5EA/CA) #10740 IS FOR THE SET RADIAL ARTERIAL

## (undated) DEVICE — BLADE SURGICAL CLIPPER - (50EA/CA)

## (undated) DEVICE — SUTURE 2-0 ETHILON FS - (36/BX) 18 INCH

## (undated) DEVICE — SODIUM CHL. INJ. 0.9% 500ML (24EA/CA 50CA/PF)

## (undated) DEVICE — SUTURE CV

## (undated) DEVICE — GOWN SURGEONS X-LARGE - DISP. (30/CA)

## (undated) DEVICE — SUTURE GENERAL

## (undated) DEVICE — SUTURE 3-0 SILK 12 X 18 IN - (36/BX)

## (undated) DEVICE — SUTURE 4-0 30CM STRATAFIX SPIRAL PS-2 (12EA/BX)

## (undated) DEVICE — SUTURE 5-0 PROLENE BLUE C-1 HS 1 X 30 (36EA/BX)"

## (undated) DEVICE — KIT ANESTHESIA W/CIRCUIT & 3/LT BAG W/FILTER (20EA/CA)

## (undated) DEVICE — GOWN SURGEONS LARGE - (32/CA)

## (undated) DEVICE — VESSELOOP MAXI BLUE STERILE- SURG-I-LOOP (10EA/BX)

## (undated) DEVICE — MASK ANESTHESIA ADULT  - (100/CA)

## (undated) DEVICE — LACTATED RINGERS INJ 1000 ML - (14EA/CA 60CA/PF)

## (undated) DEVICE — NEPTUNE 4 PORT MANIFOLD - (20/PK)

## (undated) DEVICE — HEAD HOLDER JUNIOR/ADULT

## (undated) DEVICE — DRAPE IOBAN II INCISE 23X17 - (10EA/BX 4BX/CA)

## (undated) DEVICE — DRESSING TRANSPARENT FILM TEGADERM 4 X 4.75" (50EA/BX)"

## (undated) DEVICE — KIT SURGIFLO W/OUT THROMBIN - (6EA/CA)

## (undated) DEVICE — TUBING CLEARLINK DUO-VENT - C-FLO (48EA/CA)

## (undated) DEVICE — SHEET THYROID - (10EA/CA)

## (undated) DEVICE — SODIUM CHL IRRIGATION 0.9% 1000ML (12EA/CA)

## (undated) DEVICE — ELECTRODE DUAL RETURN W/ CORD - (50/PK)

## (undated) DEVICE — GLOVE BIOGEL SZ 6 PF LATEX - (50EA/BX 4BX/CA)

## (undated) DEVICE — SUCTION INSTRUMENT YANKAUER BULBOUS TIP W/O VENT (50EA/CA)

## (undated) DEVICE — KIT ROOM DECONTAMINATION

## (undated) DEVICE — DECANTER FLD BLS - (50/CA)

## (undated) DEVICE — CLIP MED INTNL HRZN TI ESCP - (25/BX)

## (undated) DEVICE — CANISTER SUCTION 3000ML MECHANICAL FILTER AUTO SHUTOFF MEDI-VAC NONSTERILE LF DISP  (40EA/CA)

## (undated) DEVICE — STAPLER SKIN DISP - (6/BX 10BX/CA) VISISTAT

## (undated) DEVICE — SPONGE GAUZESTER 4 X 4 4PLY - (128PK/CA)

## (undated) DEVICE — GOWN WARMING STANDARD FLEX - (30/CA)

## (undated) DEVICE — TOWELS CLOTH SURGICAL - (4/PK 20PK/CA)

## (undated) DEVICE — VESSELOOP MINI BLUE STERILE - SURG-I-LOOP (10EA/BX)

## (undated) DEVICE — STERI STRIP COMPOUND BENZOIN - TINCTURE 0.6ML WITH APPLICATOR (40EA/BX)

## (undated) DEVICE — CLOSURE SKIN STRIP 1/2 X 4 IN - (STERI STRIP) (50/BX 4BX/CA)

## (undated) DEVICE — Device

## (undated) DEVICE — CLIP SM INTNL HRZN TI ESCP LGT - (24EA/PK 25PK/BX)

## (undated) DEVICE — SUTURE 2-0 VICRYL PLUS CT-1 36 (36PK/BX)"

## (undated) DEVICE — RESERVOIR SUCTION 100 CC - SILICONE (20EA/CA)

## (undated) DEVICE — SENSOR SPO2 NEO LNCS ADHESIVE (20/BX) SEE USER NOTES

## (undated) DEVICE — ELECTRODE 850 FOAM ADHESIVE - HYDROGEL RADIOTRNSPRNT (50/PK)

## (undated) DEVICE — SYRINGE SAFETY TB 1 ML 27 GA X 1/2 IN (100/BX 4BX/CA)

## (undated) DEVICE — DRAIN J-VAC 7MM FLAT - (10EA/CA)

## (undated) DEVICE — SET LEADWIRE 5 LEAD BEDSIDE DISPOSABLE ECG (1SET OF 5/EA)

## (undated) DEVICE — DRAPE MAGNETIC (INSTRA-MAG) - (30/CA)

## (undated) DEVICE — BLADE SURGICAL #11 - (50/BX)

## (undated) DEVICE — POLY UMBILICAL TAPE 1/8X30 - (36/BX)

## (undated) DEVICE — SOD. CHL. INJ. 0.9% 250 ML - (36/CA 50CA/PF)

## (undated) DEVICE — SLEEVE, VASO, THIGH, MED

## (undated) DEVICE — SOD. CHL. INJ. 0.9% 1000 ML - (14EA/CA 60CA/PF)